# Patient Record
Sex: MALE
[De-identification: names, ages, dates, MRNs, and addresses within clinical notes are randomized per-mention and may not be internally consistent; named-entity substitution may affect disease eponyms.]

---

## 2017-12-18 ENCOUNTER — HOSPITAL ENCOUNTER (INPATIENT)
Dept: HOSPITAL 42 - ED | Age: 77
LOS: 4 days | Discharge: NURSING, REHAB OR LONG-TERM CARE | DRG: 194 | End: 2017-12-22
Attending: INTERNAL MEDICINE | Admitting: INTERNAL MEDICINE
Payer: MEDICARE

## 2017-12-18 VITALS — BODY MASS INDEX: 34 KG/M2

## 2017-12-18 DIAGNOSIS — E66.01: ICD-10-CM

## 2017-12-18 DIAGNOSIS — N18.9: ICD-10-CM

## 2017-12-18 DIAGNOSIS — I13.0: ICD-10-CM

## 2017-12-18 DIAGNOSIS — Z79.84: ICD-10-CM

## 2017-12-18 DIAGNOSIS — J44.0: ICD-10-CM

## 2017-12-18 DIAGNOSIS — E11.22: ICD-10-CM

## 2017-12-18 DIAGNOSIS — R65.10: ICD-10-CM

## 2017-12-18 DIAGNOSIS — R19.7: ICD-10-CM

## 2017-12-18 DIAGNOSIS — I35.0: ICD-10-CM

## 2017-12-18 DIAGNOSIS — M10.9: ICD-10-CM

## 2017-12-18 DIAGNOSIS — Z79.01: ICD-10-CM

## 2017-12-18 DIAGNOSIS — E87.6: ICD-10-CM

## 2017-12-18 DIAGNOSIS — T36.95XA: ICD-10-CM

## 2017-12-18 DIAGNOSIS — E78.5: ICD-10-CM

## 2017-12-18 DIAGNOSIS — F17.200: ICD-10-CM

## 2017-12-18 DIAGNOSIS — I50.9: ICD-10-CM

## 2017-12-18 DIAGNOSIS — Z85.46: ICD-10-CM

## 2017-12-18 DIAGNOSIS — Z88.0: ICD-10-CM

## 2017-12-18 DIAGNOSIS — N40.0: ICD-10-CM

## 2017-12-18 DIAGNOSIS — I48.0: ICD-10-CM

## 2017-12-18 DIAGNOSIS — Z79.4: ICD-10-CM

## 2017-12-18 DIAGNOSIS — Z79.82: ICD-10-CM

## 2017-12-18 DIAGNOSIS — J18.9: Primary | ICD-10-CM

## 2017-12-18 LAB
ALBUMIN/GLOB SERPL: 1 {RATIO} (ref 1.1–1.8)
ALP SERPL-CCNC: 100 U/L (ref 38–126)
ALT SERPL-CCNC: 27 U/L (ref 7–56)
APPEARANCE UR: CLEAR
APTT BLD: 29.9 SECONDS (ref 25.1–36.5)
AST SERPL-CCNC: 18 U/L (ref 17–59)
BASE EXCESS BLDV CALC-SCNC: 1.5 MMOL/L (ref 0–2)
BASOPHILS # BLD AUTO: 0.01 K/MM3 (ref 0–2)
BASOPHILS NFR BLD: 0.1 % (ref 0–3)
BILIRUB SERPL-MCNC: 0.5 MG/DL (ref 0.2–1.3)
BILIRUB UR-MCNC: NEGATIVE MG/DL
BUN SERPL-MCNC: 20 MG/DL (ref 7–21)
CALCIUM SERPL-MCNC: 8.5 MG/DL (ref 8.4–10.5)
CHLORIDE SERPL-SCNC: 103 MMOL/L (ref 98–107)
CO2 SERPL-SCNC: 27 MMOL/L (ref 21–33)
COLOR UR: YELLOW
EOSINOPHIL # BLD: 0.2 10*3/UL (ref 0–0.7)
EOSINOPHIL NFR BLD: 1.6 % (ref 1.5–5)
EPI CELLS #/AREA URNS HPF: (no result) /HPF (ref 0–5)
ERYTHROCYTE [DISTWIDTH] IN BLOOD BY AUTOMATED COUNT: 13.5 % (ref 11.5–14.5)
GLOBULIN SER-MCNC: 3 GM/DL
GLUCOSE SERPL-MCNC: 188 MG/DL (ref 70–110)
GLUCOSE UR STRIP-MCNC: NEGATIVE MG/DL
GRANULOCYTES # BLD: 9.63 10*3/UL (ref 1.4–6.5)
GRANULOCYTES NFR BLD: 82.4 % (ref 50–68)
HCT VFR BLD CALC: 29.4 % (ref 42–52)
INR PPP: 1.27 (ref 0.93–1.08)
KETONES UR STRIP-MCNC: NEGATIVE MG/DL
LEUKOCYTE ESTERASE UR-ACNC: NEGATIVE LEU/UL
LYMPHOCYTES # BLD: 0.9 10*3/UL (ref 1.2–3.4)
LYMPHOCYTES NFR BLD AUTO: 7.5 % (ref 22–35)
MCH RBC QN AUTO: 28.7 PG (ref 25–35)
MCHC RBC AUTO-ENTMCNC: 34.7 G/DL (ref 31–37)
MCV RBC AUTO: 82.8 FL (ref 80–105)
MONOCYTES # BLD AUTO: 1 10*3/UL (ref 0.1–0.6)
MONOCYTES NFR BLD: 8.4 % (ref 1–6)
PH BLDV: 7.38 [PH] (ref 7.32–7.43)
PH UR STRIP: 6 [PH] (ref 4.7–8)
PLATELET # BLD: 308 10^3/UL (ref 120–450)
PMV BLD AUTO: 10.6 FL (ref 7–11)
POTASSIUM SERPL-SCNC: 3 MMOL/L (ref 3.6–5)
PROT SERPL-MCNC: 6.1 G/DL (ref 5.8–8.3)
PROT UR STRIP-MCNC: (no result) MG/DL
RBC # UR STRIP: NEGATIVE /UL
RBC #/AREA URNS HPF: (no result) /HPF (ref 0–2)
SODIUM SERPL-SCNC: 138 MMOL/L (ref 132–148)
SP GR UR STRIP: 1.01 (ref 1–1.03)
TROPONIN I SERPL-MCNC: 0.04 NG/ML
UROBILINOGEN UR STRIP-ACNC: 0.2 E.U./DL
WBC # BLD AUTO: 11.7 10^3/UL (ref 4.5–11)
WBC #/AREA URNS HPF: (no result) /HPF (ref 0–6)

## 2017-12-18 RX ADMIN — VANCOMYCIN HYDROCHLORIDE SCH MG: 500 INJECTION, POWDER, LYOPHILIZED, FOR SOLUTION INTRAVENOUS at 22:05

## 2017-12-18 RX ADMIN — INSULIN HUMAN SCH: 100 INJECTION, SOLUTION PARENTERAL at 22:51

## 2017-12-18 RX ADMIN — AZTREONAM SCH: 1 INJECTION, SOLUTION INTRAVENOUS at 21:02

## 2017-12-18 RX ADMIN — IPRATROPIUM BROMIDE AND ALBUTEROL SULFATE SCH ML: .5; 3 SOLUTION RESPIRATORY (INHALATION) at 19:52

## 2017-12-18 NOTE — RAD
HISTORY:

cough  



COMPARISON:

Chest x-ray performed 8/8/12



TECHNIQUE:

Chest, one view.



FINDINGS:





LUNGS:

Extensive patchy opacities throughout the right vane thorax with 

sparing of the right lung apex.  Appearance concerning for pneumonia. 

 Recommend follow-up upon completion of treatment of acute symptoms 

in order to ensure complete resolution and exclude possibility of 

underlying neoplasm. 



Please note that chest x-ray has limited sensitivity for the 

detection of pulmonary masses.



PLEURA:

No significant pleural effusion identified. No definite pneumothorax .



CARDIOVASCULAR:

Partially obscured right heart border.  Heart size appears top normal.



OSSEOUS STRUCTURES:

 Degenerative changes.



VISUALIZED UPPER ABDOMEN:

Unremarkable.



OTHER FINDINGS:

None.



IMPRESSION:

Extensive patchy opacities throughout the right vane thorax with 

sparing of the right lung apex.  Appearance concerning for pneumonia. 

 Recommend follow-up upon completion of treatment of acute symptoms 

in order to ensure complete resolution and exclude possibility of 

underlying neoplasm.

## 2017-12-18 NOTE — ED PDOC
Arrival/HPI





- General


Time Seen by Provider: 12/18/17 12:15


Historian: Patient





- History of Present Illness


Narrative History of Present Illness (Text): 





12/18/17 12:15


76yo male with Past medical history  significant for hypertension, NIDDM, COPD, 

biba from his PMD's office for one month history of diarrhea, brownish 

productive cough, MONTALVO and generalized weakness. States he was treated for 

Pneumonia and then he started having diarrhea s/p. MONTALVO started recently. One 

episode of diarrhea today. Denies fever, chills, abdominal pain, chest pain, 

palpitation, diaphoresis, orthopnea, LE edema, calf pain, any other [id number]. 

Notes the he was told a week ago after chest xray that he have CHF.





Past Medical History





- Provider Review


Nursing Documentation Reviewed: Yes





- Pulmonary


Hx Respiratory Disorders:  (REMAINS CONGESTED,PNEUMONIA)





- HEENT


Hx HEENT Disorder:  (READING,TONSILLECTOMY)





- Musculoskeletal/Rheumatological


Hx Falls: No





- Psychiatric


Hx Depression: No


Hx Emotional Abuse: No


Hx Physical Abuse: No


Hx Substance Use: No





- Anesthesia


Hx Anesthesia: Yes





- Suicidal Assessment


Feels Threatened In Home Enviroment: No





Family/Social History





- Physician Review


Nursing Documentation Reviewed: Yes


Family/Social History: Unknown Family HX


Smoking Status: Former Smoker


Hx Alcohol Use: No


Hx Substance Use: No


Hx Substance Use Treatment: No





Allergies/Home Meds


Allergies/Adverse Reactions: 


Allergies





atorvastatin Allergy (Verified 12/18/17 12:24)


 RASH


Penicillins Allergy (Verified 12/18/17 12:24)


 PAIN


procaine Allergy (Verified 12/18/17 12:24)


 RASH


propoxyphene Allergy (Verified 12/18/17 12:24)


 RASH


darvon Allergy (Severe, Uncoded 07/30/12 11:20)


 PAIN


 palpitations 


novacaine Allergy (Severe, Uncoded 07/30/12 11:18)


 PAIN


 palpitations 








Home Medications: 


 Home Meds











 Medication  Instructions  Recorded  Confirmed


 


Aspirin 81 mg PO DAILY 07/30/12 12/18/17


 


Bethanechol Chloride [Bethanechol] 25 mg PO BID 07/30/12 12/18/17


 


Carvedilol [Coreg] 12.5 mg PO BID 07/30/12 12/18/17


 


Doxercalciferol [Hectorol] 2.5 mcg PO DAILY 07/30/12 12/18/17


 


Eplerenone [Inspra] 25 mg PO DAILY 07/30/12 12/18/17


 


Ezetimibe [Zetia] 10 mg PO DAILY 07/30/12 12/18/17


 


Finasteride 5 mg PO DAILY 07/30/12 12/18/17


 


Furosemide 20 mg PO BID 07/30/12 12/18/17


 


Glimepiride 2 mg PO BID 07/30/12 12/18/17


 


Nisoldipine [Sular] 25.5 mg PO DAILY 07/30/12 12/18/17


 


Sildenafil Citrate [Viagra] 100 mg PO PRN PRN 07/30/12 12/18/17


 


Tamsulosin [Flomax] 0.4 mg PO BID 07/30/12 12/18/17


 


Acarbose [Precose] 50 mg PO TID 12/18/17 12/18/17


 


Chlorthalidone 0.5 tab PO MWF 12/18/17 12/18/17


 


Colchicine 0.6 PO DAILY PRN 12/18/17 


 


Ergocalciferol (Vitamin D2) PO MON 12/18/17 





[Vitamin D2]   


 


Icosapent Ethyl [Vascepa] 1 gm PO BID 12/18/17 12/18/17


 


Irbesartan [Avapro] 300 mg PO MWF 12/18/17 12/18/17


 


Linagliptin [Tradjenta] 5 mg PO DAILY 12/18/17 12/18/17


 


Omeprazole 40 DAILY 12/18/17 


 


Repaglinide [Prandin] 0.5 mg PO DAILY 12/18/17 12/18/17


 


Uloric 80 PRN PRN 12/18/17 














Review of Systems





- Physician Review


All systems were reviewed & negative as marked: Yes





- Review of Systems


Constitutional: Fatigue


Eyes: Normal


ENT: Normal


Respiratory: Cough, Sputum


Cardiovascular: Normal


Gastrointestinal: Diarrhea.  absent: Abdominal Pain, Constipation, Nausea, 

Vomiting, Hematochezia, Hematemesis


Genitourinary Male: Normal


Musculoskeletal: Normal


Skin: Normal


Neurological: Normal


Endocrine: Normal


Hemo/Lymphatic: Normal


Psychiatric: Normal





Physical Exam


Vital Signs Reviewed: Yes


Vital Signs











  Temp Pulse Resp BP Pulse Ox


 


 12/18/17 13:58   110 H  18  139/64  96


 


 12/18/17 13:48   114 H   139/64 


 


 12/18/17 12:17  98.0 F  98 H  18  142/90  96











Temperature: Afebrile


Blood Pressure: Normal


Pulse: Regular


Respiratory Rate: Normal


Appearance: Positive for: Well-Appearing, Non-Toxic, Comfortable, Other (

Morbildy obese)


Pain Distress: None


Mental Status: Positive for: Alert and Oriented X 3





- Systems Exam


Head: Present: Atraumatic, Normocephalic


Pupils: Present: PERRL


Extroacular Muscles: Present: EOMI


Conjunctiva: Present: Normal


Mouth: Present: Moist Mucous Membranes


Neck: Present: Normal Range of Motion


Respiratory/Chest: Present: Clear to Auscultation, Good Air Exchange, Rhonchi (

Scattered).  No: Respiratory Distress, Accessory Muscle Use, Wheezes, Decreased 

Breath Sounds, Rales, Retracting


Cardiovascular: Present: Regular Rate and Rhythm, Normal S1, S2.  No: Murmurs


Abdomen: Present: Distention (Distention secondary to body habitus), Normal 

Bowel Sounds.  No: Tenderness, Peritoneal Signs, Rebound, Guarding, McBurney's 

Point Tender, Rovsing's Sign Present


Back: Present: Normal Inspection


Upper Extremity: Present: Normal Inspection.  No: Cyanosis, Edema


Lower Extremity: Present: Normal Inspection.  No: Edema


Neurological: Present: GCS=15, CN II-XII Intact, Speech Normal


Skin: Present: Warm, Dry, Normal Color.  No: Rashes


Psychiatric: Present: Alert, Oriented x 3, Normal Insight, Normal Concentration





Medical Decision Making


ED Course and Treatment: 





12/18/17 18:22


Pt presented for stated history. He was tachy, but not hypoxic. He was not in 

respiratory distress.





Small leukocytosis was noted. Hypokalemia (3.0) and BNP >8000 was noted. 

Potassium was repleted. 





Afib with RVR.


Couldn't find old EKG to compare. PT was given 5mg of lopressor.





CXR RML infiltrate/PNE.





Pt was on oral antibiotic and failed outpt therapy. LEvaquin was started for 

Pneumonia





C. Diff toxin ordered and pending.





Case was DW Dr. Pathak and she accepted pt into her service.














- Lab Interpretations


Lab Results: 








 12/18/17 12:49 





 12/18/17 12:49 





 Lab Results





12/18/17 12:49: Sodium 138, Chloride 103, Potassium 3.0 L, Carbon Dioxide 27, 

Anion Gap 11, BUN 20, Creatinine 1.4, Est GFR (African Amer) 60, Est GFR (Non-

Af Amer) 49, Random Glucose 188 H, Calcium 8.5, Total Bilirubin 0.5, AST 18, 

ALT 27, Alkaline Phosphatase 100, Lactate Dehydrogenase 469, Total Creatine 

Kinase 130, Troponin I 0.04, NT-Pro-B Natriuret Pep 8660 H, Total Protein 6.1, 

Albumin 3.1, Globulin 3.0, Albumin/Globulin Ratio 1.0 L


12/18/17 12:49: pO2 42, VBG pH 7.38, VBG pCO2 46.0, VBG HCO3 27.2, VBG Total 

CO2 28.6 H, VBG O2 Sat (Calc) 79.1 H, VBG Base Excess 1.5, VBG Potassium 2.9 L, 

Sodium 138.0, Chloride 105.0, Glucose 193 H, Lactate 1.3, FiO2 21.0, Venous 

Blood Potassium 2.9 L


12/18/17 12:49: PT 14.0 H, INR 1.27 H, APTT 29.9


12/18/17 12:49: WBC 11.7 H, RBC 3.55, Hgb 10.2 L, Hct 29.4 L, MCV 82.8, MCH 28.7

, MCHC 34.7, RDW 13.5, Plt Count 308, MPV 10.6, Gran % 82.4 H, Lymph % (Auto) 

7.5 L, Mono % (Auto) 8.4 H, Eos % (Auto) 1.6, Baso % (Auto) 0.1, Gran # 9.63 H, 

Lymph # 0.9 L, Mono # 1.0 H, Eos # 0.2, Baso # 0.01











- RAD Interpretation


Radiology Orders: 








12/18/17 12:40


CHEST PORTABLE [RAD] Stat 














- Medication Orders


Current Medication Orders: 








Acetaminophen (Tylenol 325mg Tab)  650 mg PO Q6H PRN


   PRN Reason: Fever >100.4 F


Albuterol/Ipratropium (Duoneb 3 Mg/0.5 Mg (3 Ml) Ud)  3 ml IH Q2H PRN


   PRN Reason: Shortness of Breath


Albuterol/Ipratropium (Duoneb 3 Mg/0.5 Mg (3 Ml) Ud)  3 ml IH [id number] [facility]


Aspirin (Ecotrin)  81 mg PO DAILY [facility]


Bethanechol Chloride (Urecholine)  25 mg PO TID [facility]


   Last Admin: 12/18/17 18:15  Dose: 25 mg





Carvedilol (Coreg)  12.5 mg PO BID [facility]


Doxercalciferol (Hectorol)  2.5 mcg PO DAILY [facility]


Finasteride (Proscar)  5 mg PO DAILY [facility]


Glimepiride (Amaryl)  2 mg PO BID [facility]


Doxycycline Hyclate 100 mg/ (Sodium Chloride)  100 mls @ 100 mls/hr IVPB Q12 [facility]


   PRN Reason: Protocol


Aztreonam (Azactam 1 Gm)  100 mls @ 100 mls/hr IV Q12 [facility]


   PRN Reason: Protocol


   Stop: 12/27/17 22:01


Insulin Human Regular (Humulin R Med)  0 units SC ACHS [facility]


   PRN Reason: Protocol


Ondansetron HCl (Zofran Inj)  4 mg IVP Q6H PRN


   PRN Reason: Nausea/Vomiting


Pantoprazole Sodium (Protonix Ec Tab)  40 mg PO 0630 [facility]


Tamsulosin HCl (Flomax)  0.4 mg PO BID [facility]


Valsartan (Diovan)  320 mg PO DAILY [facility]


Vancomycin HCl (Vancocin 25 Mg/Ml (Oral Use))  250 mg PO QID [facility]


   PRN Reason: Protocol


   Stop: 01/01/18 22:01





Discontinued Medications





Albuterol/Ipratropium (Duoneb 3 Mg/0.5 Mg (3 Ml) Ud)  3 ml IH STAT STA


   Stop: 12/18/17 13:29


   Last Admin: 12/18/17 13:48  Dose: 3 ml





Levofloxacin/Dextrose (Levaquin 500mg)  500 mg in 100 mls @ 100 mls/hr IVPB 

STAT STA


   PRN Reason: Protocol


   Stop: 12/18/17 14:04


   Last Admin: 12/18/17 13:34  Dose: 100 mls/hr





eMAR Start Stop


 Document     12/18/17 13:34  HI  (Rec: 12/18/17 13:34  HI  [facility] [id number])


     Intravenous Solution


      Start Date                                 12/18/17


      Start Time                                 13:34





Potassium Chloride (Potassium Chloride 20 Meq/100 Ml)  20 meq in 100 mls @ 50 

mls/hr IVPB Q2H STA


   Stop: 12/18/17 15:13


   Last Admin: 12/18/17 13:34  Dose: 50 mls/hr





eMAR Start Stop


 Document     12/18/17 13:34  HI  (Rec: 12/18/17 13:34  [id number])


     Intravenous Solution


      Start Date                                 12/18/17


      Start Time                                 13:34





Metoprolol Tartrate (Lopressor)  5 mg IVP STAT STA


   Stop: 12/18/17 13:29


   Last Admin: 12/18/17 13:48  Dose: 5 mg





IVP Administration


 Document     12/18/17 13:48  HI  (Rec: 12/18/17 13:48  [id number])


     Charges for Administration


      # of IVP Administrations                   1


MAR Pulse and Blood Pressure


 Document     12/18/17 13:48  HI  (Rec: 12/18/17 13:48  [id number])


     Pulse


      Pulse Rate (60-90)                         114


     Blood Pressure


      Blood Pressure (100//90)             139/64














Disposition/Present on Arrival





- Present on Arrival


Any Indicators Present on Arrival: No


History of DVT/PE: No


History of Uncontrolled Diabetes: No


Urinary Catheter: No


History of Decub. Ulcer: No


History Surgical Site Infection Following: None





- Disposition


Have Diagnosis and Disposition been Completed?: Yes


Diagnosis: 


 Pneumonia, Afib, Hypokalemia, CHF (congestive heart failure)





Disposition: HOSPITALIZED


Disposition Time: 17:00


Patient Problems: 


 Current Active Problems











Problem Status Onset


 


Afib Acute  


 


CHF (congestive heart failure) Acute  


 


Hypokalemia Acute  


 


Pneumonia Acute  











Condition: FAIR

## 2017-12-19 LAB
ALBUMIN/GLOB SERPL: 1.1 {RATIO} (ref 1.1–1.8)
ALP SERPL-CCNC: 96 U/L (ref 38–126)
ALT SERPL-CCNC: 29 U/L (ref 7–56)
AST SERPL-CCNC: 20 U/L (ref 17–59)
BILIRUB SERPL-MCNC: 0.4 MG/DL (ref 0.2–1.3)
BUN SERPL-MCNC: 21 MG/DL (ref 7–21)
CALCIUM SERPL-MCNC: 8.8 MG/DL (ref 8.4–10.5)
CHLORIDE SERPL-SCNC: 105 MMOL/L (ref 98–107)
CO2 SERPL-SCNC: 27 MMOL/L (ref 21–33)
ERYTHROCYTE [DISTWIDTH] IN BLOOD BY AUTOMATED COUNT: 13.8 % (ref 11.5–14.5)
GLOBULIN SER-MCNC: 2.9 GM/DL
GLUCOSE SERPL-MCNC: 140 MG/DL (ref 70–110)
HCT VFR BLD CALC: 29 % (ref 42–52)
MCH RBC QN AUTO: 27.4 PG (ref 25–35)
MCHC RBC AUTO-ENTMCNC: 32.8 G/DL (ref 31–37)
MCV RBC AUTO: 83.6 FL (ref 80–105)
PLATELET # BLD: 296 10^3/UL (ref 120–450)
PMV BLD AUTO: 10.2 FL (ref 7–11)
POTASSIUM SERPL-SCNC: 3.5 MMOL/L (ref 3.6–5)
PROT SERPL-MCNC: 6 G/DL (ref 5.8–8.3)
SODIUM SERPL-SCNC: 140 MMOL/L (ref 132–148)
WBC # BLD AUTO: 10.7 10^3/UL (ref 4.5–11)

## 2017-12-19 RX ADMIN — VANCOMYCIN HYDROCHLORIDE SCH MG: 500 INJECTION, POWDER, LYOPHILIZED, FOR SOLUTION INTRAVENOUS at 22:04

## 2017-12-19 RX ADMIN — AZTREONAM SCH MLS/HR: 1 INJECTION, SOLUTION INTRAVENOUS at 22:03

## 2017-12-19 RX ADMIN — VANCOMYCIN HYDROCHLORIDE SCH MG: 500 INJECTION, POWDER, LYOPHILIZED, FOR SOLUTION INTRAVENOUS at 13:22

## 2017-12-19 RX ADMIN — INSULIN HUMAN SCH UNITS: 100 INJECTION, SOLUTION PARENTERAL at 12:26

## 2017-12-19 RX ADMIN — IPRATROPIUM BROMIDE AND ALBUTEROL SULFATE SCH ML: .5; 3 SOLUTION RESPIRATORY (INHALATION) at 07:25

## 2017-12-19 RX ADMIN — POTASSIUM CHLORIDE SCH MEQ: 20 TABLET, EXTENDED RELEASE ORAL at 18:05

## 2017-12-19 RX ADMIN — PANTOPRAZOLE SODIUM SCH MG: 40 TABLET, DELAYED RELEASE ORAL at 05:38

## 2017-12-19 RX ADMIN — IPRATROPIUM BROMIDE AND ALBUTEROL SULFATE SCH ML: .5; 3 SOLUTION RESPIRATORY (INHALATION) at 13:17

## 2017-12-19 RX ADMIN — VANCOMYCIN HYDROCHLORIDE SCH MG: 500 INJECTION, POWDER, LYOPHILIZED, FOR SOLUTION INTRAVENOUS at 18:00

## 2017-12-19 RX ADMIN — IPRATROPIUM BROMIDE AND ALBUTEROL SULFATE SCH: .5; 3 SOLUTION RESPIRATORY (INHALATION) at 20:00

## 2017-12-19 RX ADMIN — INSULIN HUMAN SCH: 100 INJECTION, SOLUTION PARENTERAL at 07:54

## 2017-12-19 RX ADMIN — IPRATROPIUM BROMIDE AND ALBUTEROL SULFATE SCH ML: .5; 3 SOLUTION RESPIRATORY (INHALATION) at 01:54

## 2017-12-19 RX ADMIN — POTASSIUM CHLORIDE SCH MEQ: 20 TABLET, EXTENDED RELEASE ORAL at 10:15

## 2017-12-19 RX ADMIN — VANCOMYCIN HYDROCHLORIDE SCH MG: 500 INJECTION, POWDER, LYOPHILIZED, FOR SOLUTION INTRAVENOUS at 10:15

## 2017-12-19 RX ADMIN — INSULIN HUMAN SCH: 100 INJECTION, SOLUTION PARENTERAL at 18:00

## 2017-12-19 RX ADMIN — AZTREONAM SCH MLS/HR: 1 INJECTION, SOLUTION INTRAVENOUS at 10:09

## 2017-12-19 NOTE — PN
DATE:



SUBJECTIVE:  The patient is 77-year-old, seen and examined, doing well.  He

states he feels lot better.  No nausea, vomiting or diarrhea.  Cough has

improved.



PHYSICAL EXAMINATION:

VITAL SIGNS:  The patient is afebrile, pulse 75, respiration 20, blood

pressure 127/48.

LUNGS:  Bilateral good airflow.  No rhonchi or crackle.

HEART:  S1, S2, audible.

ABDOMEN:  Soft, obese, nontender.  No rebound or guarding.

NEUROLOGIC:  The patient is awake, alert, oriented, communicative.



LABORATORY DATA:  WBC is 10.7, hemoglobin 9.5, hematocrit 29, platelets

296.  Chemistry:  Sodium 140, potassium 3.5, chloride 105, CO2 27, BUN 21,

creatinine 1.4, blood sugar of 145.  Blood cultures are negative.  Stool

for C. diff negative.



ASSESSMENT:

1.  Multifocal community-acquired pneumonia, failed outpatient treatment.

2.  Morbid obesity.

3.  Diarrhea induced by erythromycin, negative for Clostridium difficile.

4.  History of chronic obstructive pulmonary disease.

5.  Insulin-dependent diabetes.

6.  Renal insufficiency.



PLAN:  Currently, the patient is on Azactam since he is ALLERGIC TO

PENICILLIN and he is on doxycycline.  Continue to monitor his blood sugar,

out of bed to chair, followup echocardiogram.  We will reevaluate the

patient in a.m.







__________________________________________

Audie Pathak MD







DD:  12/19/2017 18:01:56

DT:  12/19/2017 18:06:05

Job # [id number]

## 2017-12-19 NOTE — CP.PCM.PN
Subjective





- Date & Time of Evaluation


Date of Evaluation: 12/19/17


Time of Evaluation: 09:20





- Subjective


Subjective: 





Patient has very poor veins,needs iv access





Objective





- Vital Signs/Intake and Output


Vital Signs (last 24 hours): 


 











Temp Pulse Resp BP Pulse Ox


 


 98.3 F   78   20   140/80   90 L


 


 12/19/17 16:00  12/19/17 18:04  12/19/17 16:00  12/19/17 18:04  12/19/17 16:00








Intake and Output: 


 











 12/19/17 12/20/17





 18:59 06:59


 


Intake Total 960 


 


Output Total 600 


 


Balance 360 














- Medications


Medications: 


 Current Medications





Acetaminophen (Tylenol 325mg Tab)  650 mg PO Q6H PRN


   PRN Reason: Fever >100.4 F


Albuterol/Ipratropium (Duoneb 3 Mg/0.5 Mg (3 Ml) Ud)  3 ml IH Q2H PRN


   PRN Reason: Shortness of Breath


Albuterol/Ipratropium (Duoneb 3 Mg/0.5 Mg (3 Ml) Ud)  3 ml IH [id number] [facility]


   Last Admin: 12/19/17 13:17 Dose:  3 ml


Amlodipine Besylate (Norvasc)  10 mg PO STAT [facility]


   Last Admin: 12/19/17 14:32 Dose:  10 mg


Amlodipine Besylate (Norvasc)  10 mg PO DAILY [facility]


Apixaban (Eliquis)  5 mg PO BID [facility]


   PRN Reason: Protocol


   Last Admin: 12/19/17 18:05 Dose:  5 mg


Aspirin (Ecotrin)  81 mg PO DAILY [facility]


   Last Admin: 12/19/17 10:11 Dose:  Not Given


Aspirin (Aspirin Chewable)  81 mg PO DAILY [facility]


   Last Admin: 12/19/17 10:07 Dose:  81 mg


Bethanechol Chloride (Urecholine)  25 mg PO TID [facility]


   Last Admin: 12/19/17 18:05 Dose:  25 mg


Carvedilol (Coreg)  12.5 mg PO BID [facility]


   Last Admin: 12/19/17 18:04 Dose:  12.5 mg


Doxercalciferol (Hectorol)  2.5 mcg PO DAILY [facility]


   Last Admin: 12/19/17 10:29 Dose:  2.5 mcg


Doxycycline Hyclate (Doryx)  100 mg PO Q12 [facility]


Ezetimibe (Zetia)  10 mg PO DAILY [facility]


   Last Admin: 12/19/17 10:08 Dose:  10 mg


Finasteride (Proscar)  5 mg PO DAILY [facility]


   Last Admin: 12/19/17 10:15 Dose:  5 mg


Furosemide (Lasix)  40 mg PO DAILY [facility]


   Last Admin: 12/19/17 10:15 Dose:  40 mg


Glimepiride (Amaryl)  2 mg PO BID [facility]


   Last Admin: 12/19/17 18:04 Dose:  2 mg


Aztreonam (Azactam 1 Gm)  100 mls @ 100 mls/hr IV Q12 [facility]


   PRN Reason: Protocol


   Stop: 12/27/17 22:01


   Last Admin: 12/19/17 10:09 Dose:  100 mls/hr


Insulin Human Regular (Humulin R Med)  0 units SC ACHS [facility]


   PRN Reason: Protocol


   Last Admin: 12/19/17 18:00 Dose:  Not Given


Ondansetron HCl (Zofran Inj)  4 mg IVP Q6H PRN


   PRN Reason: Nausea/Vomiting


Pantoprazole Sodium (Protonix Ec Tab)  40 mg PO 0630 [facility]


   Last Admin: 12/19/17 05:38 Dose:  40 mg


Potassium Chloride (K-Dur 20 Meq Er Tab)  20 meq PO BID [facility]


   Last Admin: 12/19/17 18:05 Dose:  20 meq


Tamsulosin HCl (Flomax)  0.4 mg PO BID [facility]


   Last Admin: 12/19/17 18:05 Dose:  0.4 mg


Valsartan (Diovan)  320 mg PO DAILY [facility]


   Last Admin: 12/19/17 10:10 Dose:  320 mg


Vancomycin HCl (Vancocin 25 Mg/Ml (Oral Use))  250 mg PO QID [facility]


   PRN Reason: Protocol


   Stop: 01/01/18 22:01


   Last Admin: 12/19/17 18:00 Dose:  250 mg











- Labs


Labs: 


 





 12/19/17 07:45 





 12/19/17 07:45 





 











PT  14.0 SECONDS (9.4-12.5)  H  12/18/17  12:49    


 


INR  1.27  (0.93-1.08)  H  12/18/17  12:49    


 


APTT  29.9 Seconds (25.1-36.5)   12/18/17  12:49    














- Constitutional


Appears: No Acute Distress





Assessment and Plan





- Assessment and Plan (Free Text)


Assessment: 





Poor venous access


Plan: 





Hep lock inserted in the L hand.


#24 angiocath used.

## 2017-12-19 NOTE — CARD
--------------- APPROVED REPORT --------------





EKG Measurement

Heart [id number]

[id number]

[id number]

[id number]



<Conclusion>

Atrial fibrillation with rapid ventricular response

Left axis deviation/LAHB

IVCD

PRWP

NSSTW changes

## 2017-12-19 NOTE — CARD
--------------- APPROVED REPORT --------------





EKG Measurement

Heart [id number]

SC [id number]

[id number]

[id number]

[id number]



<Conclusion>

Normal sinus rhythm, new c/w ECG 12/18/17 which showed AF



PRWP

Left anterior fascicular block

Prolonged QT

## 2017-12-19 NOTE — CON
DATE:  12/19/2017



INDICATION:  Shortness of breath, cough, sputum production, pneumonia.



HISTORY OF PRESENT ILLNESS:  This is a 77-year-old man known to me,

admitted yesterday through the emergency room when he presented with

shortness of breath, cough, sputum production, generalized weakness.  He

had been treated on outpatient basis for possible pneumonia.  He was given

antibiotics.  He developed diarrhea.  He became weaker.  He came to the

emergency room and was admitted.  He was found to have a right lower lobe

pneumonia.  He was admitted to room 565.  He is pan cultured.  He is

started on antibiotics.  This morning, he feels better.  There is less

shortness of breath.  He still has cough, and he still feels weak.  There

was no chest pain, orthopnea, PND, syncope, dizziness, vertigo,

palpitations, claudication, abdominal pain, nausea, vomiting, diarrhea,

constipation, melena.



PAST MEDICAL HISTORY:  Complex.  He has had a prior remote pneumonia.  He

has diabetes, hypertension, COPD.  He is a former smoker.  Hyperlipidemia,

BPH, chronic kidney disease, and gout.  There is no history of rheumatic

fever, myocardial infarction, angina, arrhythmia, stroke, TIA.



MEDICATIONS AT THE TIME OF ADMISSION:  Includes aspirin, bethanechol,

Coreg, Hectorol, Inspra, Zetia, finasteride, Lasix, glimepiride, Sular,

Flomax, Precose, chlorthalidone, Avapro, Tradjenta, omeprazole,

Prandin, Uloric.



ALLERGIES:  HE NOTES ALLERGIES TO SEVERAL MEDICATIONS INCLUDING LIPITOR,

PENICILLIN, PROCAINE, PROPOXYPHENE, NOVOCAINE.



SOCIAL HISTORY:  He lives at home.  He is ambulatory, but limited.  He is a

former smoker.  He does not drink alcohol significantly.



FAMILY HISTORY:  Noncontributory.



REVIEW OF SYSTEMS:  A 10-point review of systems is otherwise unremarkable

except as noted above.



PHYSICAL EXAMINATION:

GENERAL:  He is a well-developed male, lying in bed, on 5R, in no acute

distress.

VITAL SIGNS:  Notable for pulse of 80.  He is afebrile.  Blood pressure

elevated at 188/82, respirations 18-22, O2 sat 94-96% on nasal cannula and

room air.

HEENT:  Reveals no neck vein distention, thyromegaly, or carotid bruits. 

Mucous membranes moist.  Conjunctivae pink.

NECK:  Supple.

LUNGS:  Lung fields, rhonchi mostly on the right side.  No wheezing.

HEART:  Reveals normal first and second heart sounds.  There is a soft

systolic murmur along the left sternal border.

ABDOMEN:  Soft.  Bowel sounds present.  No mass, organomegaly, tenderness,

rebound, guarding, CVA tenderness, or palpable abdominal aortic aneurysm.

EXTREMITIES:  Reveal no cyanosis, clubbing, or edema.

NEUROLOGIC:  He is awake, alert, and oriented.

PSYCHIATRIC:  Normalized mood and affect.

SKIN:  Warm and dry.  No rash or cellulitis.



LABORATORY AND IMAGING:  A chest x-ray revealed extensive patchy opacities

throughout the right hemithorax with sparing of the right lung apex. 

Appearance concerning for pneumonia.  EKG demonstrates atrial fibrillation

with left anterior hemiblock, intraventricular conduction delay, poor

R-wave progression, nonspecific ST-wave changes.  White count 10,700,

hemoglobin 9.5, hematocrit 29, platelet count 296,000.  PT 14, INR 1.27,

PTT 29.9.  Blood gas as noted.  Electrolytes:  BUN, creatinine, blood

sugars noted.  Creatinine 1.4, potassium 3.5.  LFTs unremarkable.  ,

troponin 0.04.  BNP 8660.  Urinalysis is noted.



IMPRESSION:  Spencer Olvera is a 77-year-old man admitted with 1-month

history of symptoms, found to have a right lung pneumonia with shortness of

breath, cough, sputum production, weakness, and diarrhea after a course of

antibiotics.  He is also in atrial fibrillation which may be a new rhythm.



At this time, I agree with current plans.  He has been cultured.  He is

getting antibiotics.  There was an ID consultation pending.  I will review

his old records.  We will continue his usual medications.  Given the

presence of atrial fibrillation, he should be considered for

anticoagulation.  I will begin with Eliquis 5 mg b.i.d.  We will check

stool for occult blood.  I will review his old records.  We will get an

echocardiogram.  We will check thyroid functions.  We will continue Coreg

12.5 mg b.i.d. for rate control and hypertension.  Other cardiac

medications include aspirin, Diovan, Lasix, potassium chloride, and

lisinopril, and Zetia will be continued for an hour.  We will monitor I's

and O's, check stool for occult blood.  I will follow along with you.  I

will make additional recommendations based on his clinical course.





__________________________________________

Felice Beth MD



DD:  12/19/2017 9:24:47

DT:  12/19/2017 11:36:36

Job # [id number]

STEPHAN

## 2017-12-19 NOTE — HP
HISTORY OF PRESENT ILLNESS:  Patient is 77 years old came to the emergency

room because of history of intermittent diarrhea secondary to the

antibiotic that was given because he has been coughing for almost a week. 

Patient states he was treated for pneumonia, and he was given antibiotic

unknown to him.  He started to have diarrhea that is going on

intermittently for almost a month.  He complained of having generalized

weakness, complained of brownish productive cough, complained of diarrhea

with no blood in the stool, complained of generalized weakness.  Denies any

abdominal pain.  No chest pain.  No palpitations but have shortness of

breath.



PAST MEDICAL HISTORY:  Significant for:

1.  Hypertension.

2.  History of COPD.

3.  Insulin-dependent diabetes.

4.  History of *------*.

5.  Mild renal insufficiency.

6.  History of melanoma.

7.  History of basal cell carcinoma.



PAST SURGICAL HISTORY:  Significant for skin graft 43 years ago and

multiple skin cancer excisions.



ALLERGIES:  HE IS ALLERGIC TO PENICILLIN, ATORVASTATIN, PROCAINE, DARVON,

AND NOVOCAIN.



SOCIAL HISTORY:  He is an ex-smoker, quit 11 years ago.



MEDICATIONS AT HOME:  He is on Coreg 12.5 twice a day, Flomax 0.4 b.i.d.,

Viagra as needed, Sular *------* daily, glimepiride 2 mg twice a day, Lasix

20 mg twice a day, finasteride 5 mg daily, Zetia 10 mg      daily, Inspra

25 mg daily, Hectorol and bethanechol.



REVIEW OF SYSTEMS:  Significant for diarrhea, cough, congestion, and

shortness of breath.



PHYSICAL EXAMINATION:

GENERAL:  He is awake, alert, oriented, and communicative.

VITAL SIGNS:  He is afebrile, pulse 98, respirations 18, and blood pressure

142/90.

LUNGS:  Bilateral occasional expiratory rhonchi.

HEART:  S1, S2 audible.

ABDOMEN:  Soft, nontender, no rebound, obese.  No hepatosplenomegaly.

NEUROLOGIC:  The patient is awake, alert, oriented, able to communicate.



LABORATORY DATA:  WBC 11.7, hemoglobin 10.2, hematocrit 29.4, platelets of

308.  PT 14.0, INR 1.27.  Chemistry; sodium 138, potassium 3.0, chloride

103, CO2 of 27, BUN 20, creatinine 1.4, blood sugar of 212, BNP 1660.



X-ray of chest shows multifocal extensive patchy opacity throughout the

right hemithorax with sparing of right lung apex.



ASSESSMENT AND PLAN:

1.  Community-acquired pneumonia.

2.  Failed outpatient treatment.

3.  Non-insulin dependent diabetes.

4.  Hypertension.

5.  Hyperlipidemia.

6.  History of chronic obstructive pulmonary disease.

7.  History of cancer of prostate.



PLAN:  We will start him on nebulizer treatments, start him on doxycycline

100 b.i.d., given him Azactam, Dr. *------* for consult and monitor his

blood sugar.  We will reevaluate patient in a.m.





__________________________________________

Audie Pathak MD





DD:  12/18/2017 17:51:49

DT:  12/18/2017 20:38:59

Job # [id number]

## 2017-12-19 NOTE — PN
DATE:  12/19/2017



SUBJECTIVE:  The patient is in bed, no acute distress, nontoxic.



PHYSICAL EXAMINATION:

VITAL SIGNS:  On exam, temperature is 98, blood pressure is 180/80,

respiratory rate of 22.

HEENT:  Unremarkable.

NECK:  Supple.

LUNGS:  Have decreased breath sounds.

HEART:  Normal S1 and S2.

ABDOMEN:  Soft and nontender.



LABORATORY EXAMINATION:  Reveals a white count is down to 10,000,

hemoglobin of 9, platelets of 296.  BUN of 21, creatinine of 1.4, and blood

cultures are no growth.



ASSESSMENT AND PLAN:  This is a 77-year-old male with past medical history

significant for prostate cancer, renal disease, diabetes, hypertension with

a systemic inflammatory response syndrome, awaiting for final culture

results.  Currently, the patient is on Azactam, doxycycline, and vancomycin

p.o.  Pending further workup.  We will follow closely with you.







__________________________________________

Félix Graham MD





DD:  12/19/2017 13:25:25

DT:  12/19/2017 13:27:30

Job # [id number]

## 2017-12-20 VITALS — RESPIRATION RATE: 20 BRPM

## 2017-12-20 LAB
BUN SERPL-MCNC: 21 MG/DL (ref 7–21)
CALCIUM SERPL-MCNC: 8.6 MG/DL (ref 8.4–10.5)
CHLORIDE SERPL-SCNC: 105 MMOL/L (ref 98–107)
CO2 SERPL-SCNC: 25 MMOL/L (ref 21–33)
ERYTHROCYTE [DISTWIDTH] IN BLOOD BY AUTOMATED COUNT: 13.9 % (ref 11.5–14.5)
GLUCOSE SERPL-MCNC: 166 MG/DL (ref 70–110)
HCT VFR BLD CALC: 30.6 % (ref 42–52)
MAGNESIUM SERPL-MCNC: 2 MG/DL (ref 1.7–2.2)
MCH RBC QN AUTO: 27.7 PG (ref 25–35)
MCHC RBC AUTO-ENTMCNC: 33 G/DL (ref 31–37)
MCV RBC AUTO: 83.8 FL (ref 80–105)
PLATELET # BLD: 332 10^3/UL (ref 120–450)
PMV BLD AUTO: 10.8 FL (ref 7–11)
POTASSIUM SERPL-SCNC: 3.6 MMOL/L (ref 3.6–5)
SODIUM SERPL-SCNC: 139 MMOL/L (ref 132–148)
TROPONIN I SERPL-MCNC: 0.03 NG/ML
WBC # BLD AUTO: 12.4 10^3/UL (ref 4.5–11)

## 2017-12-20 RX ADMIN — IPRATROPIUM BROMIDE AND ALBUTEROL SULFATE SCH ML: .5; 3 SOLUTION RESPIRATORY (INHALATION) at 13:22

## 2017-12-20 RX ADMIN — VANCOMYCIN HYDROCHLORIDE SCH MG: 500 INJECTION, POWDER, LYOPHILIZED, FOR SOLUTION INTRAVENOUS at 19:40

## 2017-12-20 RX ADMIN — INSULIN HUMAN SCH UNITS: 100 INJECTION, SOLUTION PARENTERAL at 17:27

## 2017-12-20 RX ADMIN — POTASSIUM CHLORIDE SCH MEQ: 20 TABLET, EXTENDED RELEASE ORAL at 17:28

## 2017-12-20 RX ADMIN — INSULIN HUMAN SCH: 100 INJECTION, SOLUTION PARENTERAL at 09:00

## 2017-12-20 RX ADMIN — INSULIN HUMAN SCH: 100 INJECTION, SOLUTION PARENTERAL at 22:48

## 2017-12-20 RX ADMIN — IPRATROPIUM BROMIDE AND ALBUTEROL SULFATE SCH ML: .5; 3 SOLUTION RESPIRATORY (INHALATION) at 21:26

## 2017-12-20 RX ADMIN — VANCOMYCIN HYDROCHLORIDE SCH MG: 500 INJECTION, POWDER, LYOPHILIZED, FOR SOLUTION INTRAVENOUS at 13:15

## 2017-12-20 RX ADMIN — IPRATROPIUM BROMIDE AND ALBUTEROL SULFATE SCH ML: .5; 3 SOLUTION RESPIRATORY (INHALATION) at 07:32

## 2017-12-20 RX ADMIN — POTASSIUM CHLORIDE SCH MEQ: 20 TABLET, EXTENDED RELEASE ORAL at 09:23

## 2017-12-20 RX ADMIN — INSULIN HUMAN SCH UNITS: 100 INJECTION, SOLUTION PARENTERAL at 12:18

## 2017-12-20 RX ADMIN — AZTREONAM SCH MLS/HR: 1 INJECTION, SOLUTION INTRAVENOUS at 09:19

## 2017-12-20 RX ADMIN — IPRATROPIUM BROMIDE AND ALBUTEROL SULFATE SCH ML: .5; 3 SOLUTION RESPIRATORY (INHALATION) at 01:20

## 2017-12-20 RX ADMIN — VANCOMYCIN HYDROCHLORIDE SCH MG: 500 INJECTION, POWDER, LYOPHILIZED, FOR SOLUTION INTRAVENOUS at 21:00

## 2017-12-20 RX ADMIN — VANCOMYCIN HYDROCHLORIDE SCH MG: 500 INJECTION, POWDER, LYOPHILIZED, FOR SOLUTION INTRAVENOUS at 09:25

## 2017-12-20 RX ADMIN — AZTREONAM SCH MLS/HR: 1 INJECTION, SOLUTION INTRAVENOUS at 21:00

## 2017-12-20 NOTE — CARD
--------------- APPROVED REPORT --------------





EXAM: Two-dimensional and M-mode echocardiogram with Doppler and 

color Doppler.



INDICATION

Dyspnea 



2D DIMENSIONS 

Left Atrium (2D)4.6   (1.6-4.0cm)IVSd1.5   (0.7-1.1cm)

LVDd4.9   (3.9-5.9cm)LVOT Diameter2.3   (1.8-2.4cm)

PWd1.4   (0.7-1.1cm)LVDs3.3   (2.5-4.0cm)

FS (%) 32.6   %LVEF (%)60.6   (>50%)



M-Mode DIMENSIONS 

Aortic Root3.80   (2.2-3.7cm)Aortic Cusp Exc.1.60   (1.5-2.0cm)



Aortic Valve

AoV Peak Yxsswkya026.0cm/sAoV VTI48.2cmAO Peak GR.30mmHg

LVOT Peak Phjvknao620.0cm/sLVOT VTI27.00cmAO Mean GR.13mmHg

VALERY (VMAX)1.88wi4MZY (VTI)2.91jg3ZW P 1/2 Zxhf800az



Mitral Valve

MV E Zlwhkjmt225.0cm/sMV A Yghmhgak046.0cm/sE/A ratio1.0



TDI

Lateral E' Peak V7.12cm/sMedial E' Peak V6.04cm/sE/Lateral E'17.6

E/Medial E'20.7



Pulmonary Valve

PV Peak Hbktgngv88.3cm/sPV Peak Grad.3mmHg



Tricuspid Valve

TR Peak Bmxydelx789sg/sRAP AGMPNSFC95fyLdCG Peak Gr.59mmHg

OVLA92aoLc



 LEFT VENTRICLE 

The left ventricle is normal size. There is mild to moderate 

concentric left ventricular hypertrophy. The left ventricular 

function is normal. The left ventricular ejection fraction is within 

the normal range. There is normal LV segmental wall motion.



 RIGHT VENTRICLE 

The right ventricle is normal size. The right ventricular systolic 

function is normal.



 ATRIA 

The left atrium is moderately dilated. The right atrium is moderately 

dilated. The interatrial septum is intact with no evidence for an 

atrial septal defect.



 AORTIC VALVE 

The aortic valve is moderately calcified. There is mild aortic 

regurgitation. There is mild valvular aortic stenosis.



 MITRAL VALVE 

Mitral annular calcification is mild. Mitral regurgitation is trace.



 TRICUSPID VALVE 

The tricuspid valve is normal in structure. There is moderate 

tricuspid regurgitation. There is moderate pulmonary hypertension.



 PULMONIC VALVE 

The pulmonary valve is normal in structure.



 GREAT VESSELS 

The aortic root is normal in size. The IVC is normal in size and 

collapses >50% with inspiration.



 PERICARDIAL EFFUSION 

There is no pleural effusion. There is no pericardial effusion.



<Conclusion>

Biatrial enlargement.

Mild to moderate concentric LVH.

Normal LV systolic function.

Mild AS and AI.

Moderate TR.

## 2017-12-20 NOTE — PN
DATE:  12/20/2017



SUBJECTIVE:  The patient is seen lying in bed on 5R.  He is feeling

somewhat better.  His dyspnea is improved.  His latest electrocardiogram

reveals sinus rhythm.  His current medications include Amaryl, aspirin,

Azactam, carvedilol 12.5 mg b.i.d., Diovan 320 mg daily, doxycycline,

DuoNeb inhaler, Ecotrin, Eliquis 5 mg b.i.d., Flomax, potassium, Lasix 40

mg daily, Norvasc 10 mg daily, Proscar, Protonix, oral vancomycin, Zetia 10

mg daily.



OBJECTIVE:

GENERAL:  He is an elderly man who appears comfortable at rest.

VITAL SIGNS:  Blood pressure 142/82 with pulse of 86 and regular,

respirations 16.  He is afebrile.

HEENT:  No JVD.

CHEST:  Few scattered rhonchi are heard.

HEART: PMI was normal position with a soft systolic murmur noted at the

base as well as the lower left sternal border.

ABDOMEN:  Soft and nontender with normoactive bowel sounds.

EXTREMITIES:  Revealed no edema.



DIAGNOSTIC DATA:   Troponin is negative.  Potassium 3.6, BUN and creatinine

21 and 1.3, white count 12.4, hemoglobin and hematocrit 10.1 and 30.6 with

platelet count of 332,000.



IMPRESSION:

1.  Recent pneumonia, clinically improved.

2.  Paroxysmal atrial fibrillation, currently on Eliquis.

3.  Mild aortic stenosis.



RECOMMENDATIONS:   Current medications should continue for now.  If he has

no further evidence of atrial fibrillation, consideration could be given to

discontinuation of Eliquis at some point in the future.  We will continue

to follow and make further recommendations as appropriate.





__________________________________________

Dustin Acosta MD





DD:  12/20/2017 12:54:48

DT:  12/20/2017 12:58:03

Job # [id number]

## 2017-12-21 LAB
ERYTHROCYTE [DISTWIDTH] IN BLOOD BY AUTOMATED COUNT: 13.9 % (ref 11.5–14.5)
HCT VFR BLD CALC: 29.2 % (ref 42–52)
MCH RBC QN AUTO: 27.3 PG (ref 25–35)
MCHC RBC AUTO-ENTMCNC: 32.5 G/DL (ref 31–37)
MCV RBC AUTO: 83.9 FL (ref 80–105)
PLATELET # BLD: 292 10^3/UL (ref 120–450)
PMV BLD AUTO: 10.6 FL (ref 7–11)
WBC # BLD AUTO: 11.5 10^3/UL (ref 4.5–11)

## 2017-12-21 RX ADMIN — IPRATROPIUM BROMIDE AND ALBUTEROL SULFATE SCH ML: .5; 3 SOLUTION RESPIRATORY (INHALATION) at 13:37

## 2017-12-21 RX ADMIN — IPRATROPIUM BROMIDE AND ALBUTEROL SULFATE SCH: .5; 3 SOLUTION RESPIRATORY (INHALATION) at 02:31

## 2017-12-21 RX ADMIN — POTASSIUM CHLORIDE SCH MEQ: 10 TABLET, FILM COATED, EXTENDED RELEASE ORAL at 10:31

## 2017-12-21 RX ADMIN — INSULIN HUMAN SCH UNITS: 100 INJECTION, SOLUTION PARENTERAL at 13:24

## 2017-12-21 RX ADMIN — IPRATROPIUM BROMIDE AND ALBUTEROL SULFATE SCH ML: .5; 3 SOLUTION RESPIRATORY (INHALATION) at 21:00

## 2017-12-21 RX ADMIN — INSULIN HUMAN SCH: 100 INJECTION, SOLUTION PARENTERAL at 10:32

## 2017-12-21 RX ADMIN — INSULIN HUMAN SCH UNITS: 100 INJECTION, SOLUTION PARENTERAL at 17:22

## 2017-12-21 RX ADMIN — IPRATROPIUM BROMIDE AND ALBUTEROL SULFATE SCH ML: .5; 3 SOLUTION RESPIRATORY (INHALATION) at 07:24

## 2017-12-21 RX ADMIN — INSULIN HUMAN SCH: 100 INJECTION, SOLUTION PARENTERAL at 22:07

## 2017-12-21 RX ADMIN — POTASSIUM CHLORIDE SCH MEQ: 10 TABLET, FILM COATED, EXTENDED RELEASE ORAL at 17:57

## 2017-12-21 NOTE — CP.PCM.PN
Subjective





- Date & Time of Evaluation


Date of Evaluation: 12/21/17


Time of Evaluation: 07:00





- Subjective


Subjective: 





Stable on 5R. He feels better. No chest pain or SOB at rest. 





V/S noted. P = reg at 80





PE:





Lungs: rhonchi


Cor: S1S2, sys. murmur


Abd.: soft


Ext.: no edema


Neuro.: alert





I/O= 1180/400 recorded





BC X2 NG at 48 hrs.





Echo: Nl LV, Mod. LVH, Mild AS and AI, Mod. TR





Objective





- Vital Signs/Intake and Output


Vital Signs (last 24 hours): 


 











Temp Pulse Resp BP Pulse Ox


 


 97.6 F   86   20   191/93 H  94 L


 


 12/20/17 17:13  12/20/17 17:26  12/20/17 17:13  12/20/17 17:26  12/20/17 17:13








Intake and Output: 


 











 12/21/17 12/21/17





 06:59 18:59


 


Intake Total 820 


 


Output Total 400 


 


Balance 420 














- Medications


Medications: 


 Current Medications





Acetaminophen (Tylenol 325mg Tab)  650 mg PO Q6H PRN


   PRN Reason: Fever >100.4 F


Albuterol/Ipratropium (Duoneb 3 Mg/0.5 Mg (3 Ml) Ud)  3 ml IH Q2H PRN


   PRN Reason: Shortness of Breath


Albuterol/Ipratropium (Duoneb 3 Mg/0.5 Mg (3 Ml) Ud)  3 ml IH [id number] [facility]


   Last Admin: 12/21/17 07:24 Dose:  3 ml


Amlodipine Besylate (Norvasc)  10 mg PO DAILY [facility]


   Last Admin: 12/20/17 09:23 Dose:  10 mg


Apixaban (Eliquis)  5 mg PO BID [facility]


   PRN Reason: Protocol


   Last Admin: 12/20/17 17:27 Dose:  5 mg


Aspirin (Aspirin Chewable)  81 mg PO DAILY [facility]


   Last Admin: 12/20/17 09:20 Dose:  81 mg


Bethanechol Chloride (Urecholine)  25 mg PO TID [facility]


   Last Admin: 12/20/17 17:28 Dose:  25 mg


Carvedilol (Coreg)  12.5 mg PO BID [facility]


   Last Admin: 12/20/17 17:26 Dose:  12.5 mg


Doxercalciferol (Hectorol)  2.5 mcg PO DAILY [facility]


   Last Admin: 12/20/17 09:22 Dose:  2.5 mcg


Doxycycline Hyclate (Doryx)  100 mg PO Q12 [facility]


   Last Admin: 12/20/17 21:00 Dose:  100 mg


Ezetimibe (Zetia)  10 mg PO DAILY [facility]


   Last Admin: 12/20/17 09:25 Dose:  10 mg


Finasteride (Proscar)  5 mg PO DAILY [facility]


   Last Admin: 12/20/17 09:24 Dose:  5 mg


Furosemide (Lasix)  40 mg IVP DAILY [facility]


Glimepiride (Amaryl)  2 mg PO BID [facility]


   Last Admin: 12/20/17 17:26 Dose:  2 mg


Insulin Human Regular (Humulin R Med)  0 units SC ACHS [facility]


   PRN Reason: Protocol


   Last Admin: 12/20/17 22:48 Dose:  Not Given


Ondansetron HCl (Zofran Inj)  4 mg IVP Q6H PRN


   PRN Reason: Nausea/Vomiting


Pantoprazole Sodium (Protonix Ec Tab)  40 mg PO 0630 [facility]


   Last Admin: 12/19/17 05:38 Dose:  40 mg


Potassium Chloride (K-Dur 20 Meq Er Tab)  20 meq PO BID [facility]


   Last Admin: 12/20/17 17:28 Dose:  20 meq


Tamsulosin HCl (Flomax)  0.4 mg PO BID [facility]


   Last Admin: 12/20/17 17:27 Dose:  0.4 mg


Valsartan (Diovan)  320 mg PO DAILY [facility]


   Last Admin: 12/20/17 09:21 Dose:  320 mg











- Labs


Labs: 


 





 12/20/17 06:45 





 12/20/17 06:45 





 











PT  14.0 SECONDS (9.4-12.5)  H  12/18/17  12:49    


 


INR  1.27  (0.93-1.08)  H  12/18/17  12:49    


 


APTT  29.9 Seconds (25.1-36.5)   12/18/17  12:49    














Assessment and Plan





- Assessment and Plan (Free Text)


Assessment: 





SOB/Pneumonia


Diarrhea


PAF


HBP


Diabetes


COPD/Former Smoker


BPH


CKD


Gout


Echo: Mild AS and AI, Mod. TR








Plan:





AB


OOB/Ambulate as flor


IV > PO Lasix


F/U CXR Friday


Continue Eliquis for now.

## 2017-12-21 NOTE — PN
DATE:  12/21/2017



SUBJECTIVE:  The patient is in bed, in no acute distress, nontoxic.



PHYSICAL EXAMINATION:

VITAL SIGNS:  Temperature is 98, blood pressure is 150/80, respiratory rate

of 20.

HEENT:  Unremarkable.

NECK:  Supple.

LUNGS:  Have decreased breath sounds.

HEART:  Normal S1, S2.

ABDOMEN:  Soft, nontender.



LABORATORY DATA:  Reveals a white count is 11,500, hemoglobin of 9.  BUN of

21, creatinine of 1.3, procalcitonin is 0.15.  Urinalysis is noted. 

Microbiology reveals stool C. diff is negative.  Urine cultures negative. 

Blood cultures negative.  Sputum culture is negative.



ASSESSMENT AND PLAN  This is a 77-year-old male with past medical history

of prostate cancer, renal disease, diabetes, systemic inflammatory response

syndrome, negative cultures, negative urine cultures, negative urinalysis,

negative procalcitonin, currently on p.o. doxycycline.  Stool Clostridium

difficile is negative and p.o. vancomycin was discontinued.  We will check

on the urine for Legionella antigen, is pending.





__________________________________________

Félix Graham MD



DD:  12/21/2017 17:33:22

DT:  12/21/2017 17:35:47

Job # [id number]

## 2017-12-21 NOTE — PN
SUBJECTIVE:  Patient is 77 years old seen and examined, states feels

better.  No nausea, vomiting, no diarrhea.  Eating and tolerating. 

Complaining of constipation.



PHYSICAL EXAMINATION

VITAL SIGNS:  He is afebrile.  Pulse 85, respirations 20, blood pressure

150/80.

LUNGS:  Bilateral fair airflow.  Few expiratory rhonchi bilaterally.

HEART:  S1 and S2 audible.

ABDOMEN:  Soft, nontender, obese.  No hepatosplenomegaly.

NEUROLOGIC:  He is awake, alert, oriented.  Able to communicate.



LABORATORY EXAM:  WBC 11.5, hemoglobin 9.5, hematocrit 29.2, platelets of

292.  Chemistry, blood sugar is 268.  Blood culture and urine cultures are

negative.  Stool for C. difficile is negative.



ASSESSMENT:

1.  Multilobar pneumonia.

2.  Diarrhea secondary to antibiotic.

3.  History of chronic kidney disease.

4.  History of cancer of prostate.

5.  Paroxysmal atrial fibrillation.

6.  Insulin-dependent diabetes.

7.  Chronic obstructive pulmonary disease.

8.  History of heavy smoking in the past.



PLAN:  Currently, patient is on Amaryl 2 mg twice a day, and I will

increase it to 4 mg twice a day.  Continue aspirin and carvedilol.  We will

continue on valsartan.  He is on doxycycline currently and we will follow

up this patient.  Patient has been referred to TCU and if the bed is

available, patient will be transferred to TCU.





__________________________________________

Audie Pathak MD





DD:  12/21/2017 15:52:04

DT:  12/21/2017 18:35:18

Job # [id number]

## 2017-12-21 NOTE — PN
DATE:  12/20/2017



SUBJECTIVE:  The patient is 77-year-old, seen and examined, sitting in

chair, states he feels much better.  Less shortness of breath.  Cough is

improving.  No fever or chills.



PHYSICAL EXAMINATION:

VITAL SIGNS:  The patient is afebrile, pulse 88, respirations 22, blood

pressure 142/82.

LUNGS:  Bilateral few expiratory rhonchi.

HEART:  S1 and S2, audible.

ABDOMEN:  Soft, obese, nontender.  No rebound.  No guarding.

NEUROLOGIC:  He is awake, alert, and oriented, communicative.



LABORATORY EXAMINATION:  WBC is 12.4, hemoglobin 10, hematocrit 30,

platelets of 332.  Chemistry:  Sodium 139, potassium 3.6, chloride 105, CO2

of 25, BUN 21, creatinine 1.3, blood sugar of 166.  His blood culture and

urine cultures are negative.  Stool for C. diff is negative.  He had

echocardiogram done also that shows biatrial enlargement, mild-to-moderate

concentric LVH, normal LV function, moderate TR.



ASSESSMENT AND PLAN:

1.  Multilobar pneumonia.

2.  Failed outpatient treatment.

3.  Congestive heart failure.

4.  Active smoker.

5.  Paroxysmal atrial fibrillation, on Eliquis.

6.  Aortic stenosis.



PLAN:  Currently, the patient is on Azactam.  We will continue Eliquis and

he is on p.o. vancomycin.  Continue him on doxycycline, and we will

reevaluate the patient in a.m.





__________________________________________

Audie Pathak MD



DD:  12/20/2017 16:09:42

DT:  12/20/2017 18:56:06

Job # [id number]

## 2017-12-21 NOTE — CARD
--------------- APPROVED REPORT --------------





EKG Measurement

Heart [id number]

KS [id number]

[id number]

[id number]

[id number]



<Conclusion>

Normal sinus rhythm

Left anterior fascicular block

PRWP

IVCD

Prolonged QTc

No change

## 2017-12-21 NOTE — CON
DATE:



LOCATION:  The patient is in room 565.



CHIEF COMPLAINT:  Abdominal pain and diarrhea times several days.



HISTORY OF PRESENT ILLNESS:  This is a 77-year-old male with obesity, BMI

of 34 with hypertension, diabetes, chronic obstructive lung disease,

prostate cancer, renal insufficiency, history of right arm surgery, and

tonsillectomy.  HE IS ALLERGIC TO PENICILLIN, was admitted with a diagnosis

of pneumonia.  Infectious Disease consultation requested.  The patient

states that he had pneumonia which has been treated with Levaquin for a

week and he developed diarrhea with it and weakness.  He is denying any

fevers at this time.  He did have fevers at home.  No chills.  No chest

pain.  Mild abdominal cramps.



PAST MEDICAL HISTORY:  Significant for hypertension, diabetes mellitus,

chronic obstructive lung disease, prostate cancer, renal insufficiency,

tonsillectomy, and right arm surgery.



PAST SURGICAL HISTORY:  Significant for tonsillectomy and right arm

surgery.



ALLERGIES:  INCLUDE PENICILLIN, QUESTIONABLE RASH.



MEDICATIONS:  Reviewed which include Avapro, _____, Prandin, Coreg, Flomax,

Zetia, aspirin, and colchicine.



PHYSICAL EXAMINATION:

GENERAL:  The patient is in bed.

VITAL SIGNS:  Temperature of 98, heart rate of 114, respiratory rate of 18,

blood pressure is 140/_____.

HEENT:  Unremarkable.

NECK:  Supple.

LUNGS:  Have decreased breath sounds.

HEART:  Normal S1 and S2.

ABDOMEN:  Soft and nontender.  No organomegaly.  No rebound, no guarding,

no masses.



LABORATORY EXAMINATION:  Reveals a white count of 11,700, hemoglobin of 10,

and platelets of 308.  Coagulation is noted.  Chemistry reveals a BUN of 20

and creatinine of 1.4.  BNP is 8660.  Glucose is 215.  Microbiology reveals

blood cultures, no growth.  Chest x-ray, extensive infiltrate.



ASSESSMENT AND PLAN:  A 77-year-old male, hypertension, diabetes, chronic

obstructive lung disease, prostate cancer, and renal insufficiency with

ALLERGY TO PENICILLIN, treated with one week of Levaquin as outpatient and

now with community-acquired pneumonia, rule out pseudomembranous colitis,

on Azactam and doxycycline pending cultures and initial workup results

including procalcitonin, urine for Legionella, stool for Clostridium

difficile, and sputum culture.  We will follow with you.









__________________________________________

Félix Graham MD





DD:  12/18/2017 18:19:18

DT:  12/19/2017 1:19:58

Job # [id number]

## 2017-12-21 NOTE — PN
DATE:  12/20/2017



SUBJECTIVE:  The patient is seen earlier this morning.  No acute distress. 

Nontoxic.



PHYSICAL EXAMINATION:

VITAL SIGNS:  Temperature is 98, blood pressure is 190/90, respiratory rate

of 20, heart rate of 86.  The patient is 94% saturation on exam.

HEENT:  Unremarkable.

NECK:  Supple.

LUNGS:  Have decreased breath sounds.

HEART:  Normal S1 and S2.

ABDOMEN:  Soft, nontender.



LABORATORY DATA:  There is a white count of 11,700, hemoglobin of 10,

platelets of 308.  BUN of 21, creatinine of 1.3.  Procalcitonin is 0.15. 

Urinalysis is noted to be unremarkable.  Microbiology reveals the blood

cultures have no growth, urine cultures have no growth.  C. diff antigen

and toxin are negative.



ASSESSMENT AND PLAN:  A 77-year-old male with past medical history

significant for prostate cancer, renal disease, diabetes with systemic

inflammatory response syndrome and with negative blood cultures, negative

urine cultures and a negative urinalysis and negative procalcitonin of

0.12.  We will discontinue the aztreonam.  The patient is on doxycycline

p.o. and p.o. vancomycin will also be discontinued since the Clostridium

difficile toxin and antigen are both negative.  Highly unlikely to be

pseudomembranous colitis.  We will check on the urine for Legionella

antigen.  We will follow closely with you.



__________________________________________

Félix Graham MD





DD:  12/20/2017 22:04:06

DT:  12/20/2017 22:22:14

Job # [id number]

## 2017-12-22 ENCOUNTER — HOSPITAL ENCOUNTER (INPATIENT)
Dept: HOSPITAL 42 - TRCU | Age: 77
LOS: 8 days | Discharge: HOME | DRG: 194 | End: 2017-12-30
Attending: INTERNAL MEDICINE | Admitting: INTERNAL MEDICINE
Payer: COMMERCIAL

## 2017-12-22 VITALS — TEMPERATURE: 98.4 F | OXYGEN SATURATION: 94 % | HEART RATE: 86 BPM

## 2017-12-22 VITALS — SYSTOLIC BLOOD PRESSURE: 132 MMHG | DIASTOLIC BLOOD PRESSURE: 68 MMHG

## 2017-12-22 VITALS — BODY MASS INDEX: 34 KG/M2

## 2017-12-22 DIAGNOSIS — I48.2: ICD-10-CM

## 2017-12-22 DIAGNOSIS — R19.7: ICD-10-CM

## 2017-12-22 DIAGNOSIS — Z85.46: ICD-10-CM

## 2017-12-22 DIAGNOSIS — I10: ICD-10-CM

## 2017-12-22 DIAGNOSIS — Z88.0: ICD-10-CM

## 2017-12-22 DIAGNOSIS — R65.10: ICD-10-CM

## 2017-12-22 DIAGNOSIS — D64.9: ICD-10-CM

## 2017-12-22 DIAGNOSIS — R09.02: ICD-10-CM

## 2017-12-22 DIAGNOSIS — T36.95XA: ICD-10-CM

## 2017-12-22 DIAGNOSIS — E78.5: ICD-10-CM

## 2017-12-22 DIAGNOSIS — K22.70: ICD-10-CM

## 2017-12-22 DIAGNOSIS — Z79.01: ICD-10-CM

## 2017-12-22 DIAGNOSIS — N28.9: ICD-10-CM

## 2017-12-22 DIAGNOSIS — E66.01: ICD-10-CM

## 2017-12-22 DIAGNOSIS — J44.0: ICD-10-CM

## 2017-12-22 DIAGNOSIS — F17.200: ICD-10-CM

## 2017-12-22 DIAGNOSIS — I48.0: ICD-10-CM

## 2017-12-22 DIAGNOSIS — E11.9: ICD-10-CM

## 2017-12-22 DIAGNOSIS — J18.9: Primary | ICD-10-CM

## 2017-12-22 DIAGNOSIS — K92.2: ICD-10-CM

## 2017-12-22 DIAGNOSIS — Z85.820: ICD-10-CM

## 2017-12-22 LAB
BUN SERPL-MCNC: 24 MG/DL (ref 7–21)
CALCIUM SERPL-MCNC: 8.4 MG/DL (ref 8.4–10.5)
CHLORIDE SERPL-SCNC: 104 MMOL/L (ref 98–107)
CO2 SERPL-SCNC: 28 MMOL/L (ref 21–33)
GLUCOSE SERPL-MCNC: 168 MG/DL (ref 70–110)
POTASSIUM SERPL-SCNC: 4.2 MMOL/L (ref 3.6–5)
SODIUM SERPL-SCNC: 138 MMOL/L (ref 132–148)

## 2017-12-22 RX ADMIN — INSULIN HUMAN SCH UNITS: 100 INJECTION, SOLUTION PARENTERAL at 11:37

## 2017-12-22 RX ADMIN — POTASSIUM CHLORIDE SCH: 10 TABLET, FILM COATED, EXTENDED RELEASE ORAL at 18:55

## 2017-12-22 RX ADMIN — IPRATROPIUM BROMIDE AND ALBUTEROL SULFATE SCH: .5; 3 SOLUTION RESPIRATORY (INHALATION) at 02:00

## 2017-12-22 RX ADMIN — IPRATROPIUM BROMIDE AND ALBUTEROL SULFATE SCH ML: .5; 3 SOLUTION RESPIRATORY (INHALATION) at 07:59

## 2017-12-22 RX ADMIN — INSULIN HUMAN SCH UNITS: 100 INJECTION, SOLUTION PARENTERAL at 17:12

## 2017-12-22 RX ADMIN — IPRATROPIUM BROMIDE AND ALBUTEROL SULFATE SCH ML: .5; 3 SOLUTION RESPIRATORY (INHALATION) at 19:24

## 2017-12-22 RX ADMIN — PANTOPRAZOLE SODIUM SCH MG: 40 TABLET, DELAYED RELEASE ORAL at 05:27

## 2017-12-22 RX ADMIN — INSULIN HUMAN SCH UNITS: 100 INJECTION, SOLUTION PARENTERAL at 08:14

## 2017-12-22 RX ADMIN — IPRATROPIUM BROMIDE AND ALBUTEROL SULFATE SCH ML: .5; 3 SOLUTION RESPIRATORY (INHALATION) at 13:09

## 2017-12-22 RX ADMIN — POTASSIUM CHLORIDE SCH MEQ: 10 TABLET, FILM COATED, EXTENDED RELEASE ORAL at 09:15

## 2017-12-22 RX ADMIN — PANTOPRAZOLE SODIUM SCH MG: 40 TABLET, DELAYED RELEASE ORAL at 05:28

## 2017-12-22 RX ADMIN — POTASSIUM CHLORIDE SCH: 10 TABLET, FILM COATED, EXTENDED RELEASE ORAL at 09:23

## 2017-12-22 RX ADMIN — INSULIN HUMAN SCH: 100 INJECTION, SOLUTION PARENTERAL at 21:57

## 2017-12-22 NOTE — RAD
HISTORY:

pneumonia, F/U cxr  



COMPARISON:

Chest radiograph dated 12/18/2017.



TECHNIQUE:

Chest PA and lateral



FINDINGS:



LUNGS:

Similar appearance of patchy right upper and middle lobe infiltrates. 



PLEURA:

Small right pleural effusion. No pneumothorax apparent.



CARDIOVASCULAR:

Unchanged



OSSEOUS STRUCTURES:

Unchanged.



VISUALIZED UPPER ABDOMEN:

Normal.



OTHER FINDINGS:

None.



IMPRESSION:

No significant change in appearance of patchy right upper and middle 

lobe infiltrates.  Small right pleural effusion.

## 2017-12-22 NOTE — IP.NPCORE
Pneumonia Progress Notes





- Oxygenation Assessment (REQUIRED)


Documented 02: Yes


O2 Saturation: 90


Oxygen Delivery Method: Nasal Cannula


Date: 12/19/17


Time: 16:00


Documented P02: Yes


Date:: 12/18/17


Time:: 12:49





- Blood Cultures (REQUIRED)


Culture drawn: Yes


Date:: 12/18/17


Time:: 12:49





- Initial Antibiotic


Initial Antibiotic given within Four Hours:: Yes (Levaquin)





- Appropriate Antibiotic


Appropriate Antibiotic within 24 hours of Admission:: Yes (Doxycycline, Azactam)


Current Antibiotic: Doxycycline





- Smoking Cessation


Ex-Smoker (has not smoked in the last 12 months): Yes

## 2017-12-22 NOTE — CP.PCM.PN
Subjective





- Date & Time of Evaluation


Date of Evaluation: 12/22/17


Time of Evaluation: 07:00





- Subjective


Subjective: 





Stable on 5R. He feels better. No chest pain or SOB at rest. Ambulated 

yesterday.





V/S noted. P = reg at 80





PE:





Lungs: rhonchi


Cor: S1S2, sys. murmur


Abd.: soft


Ext.: no edema


Neuro.: alert





I/O= 1020/200 recorded





BC X2 NG at 3 days





Echo: Nl LV, Mod. LVH, Mild AS and AI, Mod. TR





ECG 12/21: RSR








Objective





- Vital Signs/Intake and Output


Vital Signs (last 24 hours): 


 











Temp Pulse Resp BP Pulse Ox


 


 98.0 F   88   20   110/60   93 L


 


 12/21/17 07:30  12/21/17 17:58  12/21/17 07:30  12/21/17 17:58  12/21/17 07:30








Intake and Output: 


 











 12/22/17 12/22/17





 06:59 18:59


 


Intake Total 1020 


 


Output Total 200 


 


Balance 820 














- Medications


Medications: 


 Current Medications





Acetaminophen (Tylenol 325mg Tab)  650 mg PO Q6H PRN


   PRN Reason: Fever >100.4 F


Albuterol/Ipratropium (Duoneb 3 Mg/0.5 Mg (3 Ml) Ud)  3 ml IH Q2H PRN


   PRN Reason: Shortness of Breath


Albuterol/Ipratropium (Duoneb 3 Mg/0.5 Mg (3 Ml) Ud)  3 ml IH [id number] [facility]


   Last Admin: 12/22/17 02:00 Dose:  Not Given


Amlodipine Besylate (Norvasc)  10 mg PO DAILY [facility]


   Last Admin: 12/21/17 10:32 Dose:  10 mg


Apixaban (Eliquis)  5 mg PO BID [facility]


   PRN Reason: Protocol


   Last Admin: 12/21/17 17:58 Dose:  5 mg


Aspirin (Aspirin Chewable)  81 mg PO DAILY [facility]


   Last Admin: 12/21/17 10:31 Dose:  81 mg


Bethanechol Chloride (Urecholine)  25 mg PO TID [facility]


   Last Admin: 12/21/17 17:58 Dose:  25 mg


Carvedilol (Coreg)  12.5 mg PO BID [facility]


   Last Admin: 12/21/17 17:58 Dose:  12.5 mg


Doxercalciferol (Hectorol)  2.5 mcg PO DAILY [facility]


   Last Admin: 12/21/17 10:30 Dose:  2.5 mcg


Doxycycline Hyclate (Doryx)  100 mg PO Q12 [facility]


   Last Admin: 12/21/17 21:03 Dose:  100 mg


Ezetimibe (Zetia)  10 mg PO DAILY [facility]


   Last Admin: 12/21/17 10:31 Dose:  10 mg


Finasteride (Proscar)  5 mg PO DAILY [facility]


   Last Admin: 12/21/17 10:31 Dose:  5 mg


Furosemide (Lasix)  40 mg PO DAILY [facility]


   Last Admin: 12/21/17 10:32 Dose:  40 mg


Glimepiride (Amaryl)  4 mg PO BID [facility]


   Last Admin: 12/21/17 17:58 Dose:  4 mg


Insulin Human Regular (Humulin R Med)  0 units SC ACHS [facility]


   PRN Reason: Protocol


   Last Admin: 12/21/17 22:07 Dose:  Not Given


Ondansetron HCl (Zofran Inj)  4 mg IVP Q6H PRN


   PRN Reason: Nausea/Vomiting


Pantoprazole Sodium (Protonix Ec Tab)  40 mg PO 0630 [facility]


   Last Admin: 12/22/17 05:28 Dose:  40 mg


Potassium Chloride (Klor-Con 10)  30 meq PO BID [facility]


   Last Admin: 12/21/17 17:57 Dose:  30 meq


Tamsulosin HCl (Flomax)  0.4 mg PO BID [facility]


   Last Admin: 12/21/17 17:57 Dose:  0.4 mg


Valsartan (Diovan)  320 mg PO DAILY [facility]


   Last Admin: 12/21/17 10:32 Dose:  320 mg











- Labs


Labs: 


 





 12/21/17 09:10 





 12/22/17 06:20 





 











PT  14.0 SECONDS (9.4-12.5)  H  12/18/17  12:49    


 


INR  1.27  (0.93-1.08)  H  12/18/17  12:49    


 


APTT  29.9 Seconds (25.1-36.5)   12/18/17  12:49    














Assessment and Plan





- Assessment and Plan (Free Text)


Assessment: 





SOB/Pneumonia


Diarrhea


PAF


HBP


Diabetes


COPD/Former Smoker


BPH


CKD


Gout


Prostate cancer


Echo: Mild AS and AI, Mod. TR








Plan:





AB


OOB/Ambulate as flor


 PO Lasix


F/U CXR today


Continue Eliquis for now.


TCU Eval.


As per ID and Dr. Pathak

## 2017-12-23 PROCEDURE — F07M6ZZ THERAPEUTIC EXERCISE TREATMENT OF MUSCULOSKELETAL SYSTEM - WHOLE BODY: ICD-10-PCS | Performed by: INTERNAL MEDICINE

## 2017-12-23 PROCEDURE — F08Z2ZZ GROOMING/PERSONAL HYGIENE TREATMENT: ICD-10-PCS | Performed by: INTERNAL MEDICINE

## 2017-12-23 PROCEDURE — F07Z9ZZ GAIT TRAINING/FUNCTIONAL AMBULATION TREATMENT: ICD-10-PCS | Performed by: INTERNAL MEDICINE

## 2017-12-23 PROCEDURE — F08Z1ZZ DRESSING TECHNIQUES TREATMENT: ICD-10-PCS | Performed by: INTERNAL MEDICINE

## 2017-12-23 RX ADMIN — IPRATROPIUM BROMIDE AND ALBUTEROL SULFATE SCH: .5; 3 SOLUTION RESPIRATORY (INHALATION) at 07:40

## 2017-12-23 RX ADMIN — INSULIN HUMAN SCH: 100 INJECTION, SOLUTION PARENTERAL at 07:40

## 2017-12-23 RX ADMIN — INSULIN HUMAN SCH: 100 INJECTION, SOLUTION PARENTERAL at 22:16

## 2017-12-23 RX ADMIN — PANTOPRAZOLE SODIUM SCH MG: 40 TABLET, DELAYED RELEASE ORAL at 06:09

## 2017-12-23 RX ADMIN — INSULIN HUMAN SCH UNITS: 100 INJECTION, SOLUTION PARENTERAL at 12:18

## 2017-12-23 RX ADMIN — INSULIN HUMAN SCH UNITS: 100 INJECTION, SOLUTION PARENTERAL at 17:11

## 2017-12-23 RX ADMIN — POTASSIUM CHLORIDE SCH MEQ: 10 TABLET, FILM COATED, EXTENDED RELEASE ORAL at 17:41

## 2017-12-23 RX ADMIN — POTASSIUM CHLORIDE SCH MEQ: 10 TABLET, FILM COATED, EXTENDED RELEASE ORAL at 08:55

## 2017-12-23 RX ADMIN — IPRATROPIUM BROMIDE AND ALBUTEROL SULFATE SCH: .5; 3 SOLUTION RESPIRATORY (INHALATION) at 02:13

## 2017-12-23 RX ADMIN — IPRATROPIUM BROMIDE AND ALBUTEROL SULFATE SCH ML: .5; 3 SOLUTION RESPIRATORY (INHALATION) at 19:41

## 2017-12-23 RX ADMIN — IPRATROPIUM BROMIDE AND ALBUTEROL SULFATE SCH ML: .5; 3 SOLUTION RESPIRATORY (INHALATION) at 06:25

## 2017-12-23 RX ADMIN — IPRATROPIUM BROMIDE AND ALBUTEROL SULFATE SCH ML: .5; 3 SOLUTION RESPIRATORY (INHALATION) at 14:26

## 2017-12-23 NOTE — CON
DATE:  12/23/2017



CHIEF COMPLAINT:  Weakness times several days.



HISTORY OF PRESENT ILLNESS:  This is a 77-year-old male with obesity with

BMI of 34, hypertension, diabetes mellitus, chronic obstructive lung

disease, prostate cancer, renal insufficiency, right arm surgery,

tonsillectomy, who is allergic to PENICILLIN, who was admitted with a

diagnosis of pneumonia due to acute care.  The patient is now transferred

to transitional care room 313 for physical therapy, and the patient was

found to have negative C. diff, negative urine for Legionella antigen,

negative blood cultures and at this time, he denies any fevers, any chills.

No nausea, no vomiting.



PAST MEDICAL HISTORY:  Significant for obesity, BMI of 34, hypertension,

diabetes, chronic obstructive lung disease, prostate cancer, renal

insufficiency.



PAST SURGICAL HISTORY:  Significant for right arm surgery, tonsillectomy.



ALLERGIES:  THE PATIENT IS ALLERGIC TO PENICILLIN.



PHYSICAL EXAMINATION:

VITAL SIGNS:  The patient's temperature is 98, blood pressure is 180/80,

respiratory rate 22, heart rate of 93.

HEENT:  Unremarkable.

NECK:  Supple.

LUNGS:  Have decreased breath sounds.

HEART:  Normal S1, S2.

ABDOMEN:  Soft, nontender.  No rebound or guarding.



LABORATORY:  Reveals a white count of 12,400, down to 11,500 today and the

coagulation is noted and BUN of 24, creatinine of 1.2.  Procalcitonin is

0.15 and laboratories are noted.  X-ray from yesterday is noted.



ASSESSMENT/PLAN:  A 77-year-old male with obesity, body mass index of 34

with hypertension, diabetes, chronic obstructive lung disease, prostate

cancer, renal insufficiency and admitted with systemic inflammatory

response syndrome and workup negative as far as microbiology with negative

blood cultures, negative urine cultures, negative stool.  The sputum

cultures did grow yeast.  The patient is currently on p.o. doxycycline.  We

will follow closely with you.





__________________________________________

Félix Graham MD



DD:  12/23/2017 12:09:29

DT:  12/23/2017 12:14:56

Job # [id number]

## 2017-12-23 NOTE — DS
HISTORY OF PRESENT ILLNESS:  The patient is 77-year-old, seen and examined,

doing well, eating and tolerating,  No nausea or vomiting.  No diarrhea.



PHYSICAL EXAMINATION:

VITAL SIGNS:  The patient is afebrile, pulse 86, respirations 20, blood

pressure 116/65.

LUNGS:  Bilateral soft crackles at basis.

HEART:  S1 and S2 audible.

ABDOMEN:  Soft and nontender.  No rebound.  No guarding.

NEUROLOGIC:  The patient is awake, alert, oriented, and communicative.



LABORATORY EXAM:  Blood sugar is 160.  Sodium 138, potassium 4.2, chloride

104, CO2 of 28, BUN 24, creatinine 1.2.  Blood sugar of 168.  Sputum has

yeast.  Stool for C. diff is negative.



ASSESSMENT:

1.  Failed outpatient treatment.

2.  Bilateral pneumonia.

3.  Chronic atrial fibrillation.

4.  Hypertension.

5.  Morbid obesity.

6.  Active smoker.



PLAN:  The patient is currently on antibiotic, nebulizer treatment.  We

will monitor blood sugar.  Continue on Eliquis.  The patient is accepted in

TCU.  He will be transferred to TCU to complete course of antibiotic.





__________________________________________

Audie Pathak MD





DD:  12/22/2017 18:06:33

DT:  12/23/2017 0:16:43

Job # [id number]

## 2017-12-23 NOTE — PN
DATE:  12/22/2017



SUBJECTIVE:  The patient seen in bed early this morning in room 565, bed 1.

No fevers, no chills.  No nausea.



PHYSICAL EXAMINATION:

VITAL SIGNS:  Temperature is 98 blood pressure is 130/70, respiratory rate

of 20, heart rate of 86.

HEENT:  Unremarkable.

NECK:  Supple.

LUNGS:  Have decreased breath sounds.

HEART:  Normal S1, S2.

ABDOMEN:  Soft, nontender, no rebound or guarding.



LABORATORY EXAMINATION:  Reveals a white count 11,500, hemoglobin of 9,

platelets of 292.  BUN of 24, creatinine of 1.2.  Urinalysis is noted. 

Microbiology reveals the sputum yeast.  Stool for C. diff; negative toxin

and negative antigen.  Blood cultures are negative.  Urine cultures are no

growth.



ASSESSMENT AND PLAN:  This is a 77-year-old male with past medical history

significant for prostate cancer, renal disease, diabetes, systemic

inflammatory response syndrome, negative cultures, negative Clostridium

difficile, and urine for Legionella antigen is pending.  The patient is on

p.o. doxycycline..  Chest x-ray shows slight small right pleural effusion,

no pneumothorax, the lungs patchy _____ right upper and middle lobe

infiltrate.  We will follow with you.





__________________________________________

Félix Graham MD





DD:  12/22/2017 20:09:09

DT:  12/22/2017 20:13:33

Job # [id number]

## 2017-12-24 RX ADMIN — PANTOPRAZOLE SODIUM SCH MG: 40 TABLET, DELAYED RELEASE ORAL at 05:27

## 2017-12-24 RX ADMIN — INSULIN HUMAN SCH UNITS: 100 INJECTION, SOLUTION PARENTERAL at 17:42

## 2017-12-24 RX ADMIN — POTASSIUM CHLORIDE SCH MEQ: 10 TABLET, FILM COATED, EXTENDED RELEASE ORAL at 17:41

## 2017-12-24 RX ADMIN — IPRATROPIUM BROMIDE AND ALBUTEROL SULFATE SCH ML: .5; 3 SOLUTION RESPIRATORY (INHALATION) at 07:47

## 2017-12-24 RX ADMIN — IPRATROPIUM BROMIDE AND ALBUTEROL SULFATE SCH ML: .5; 3 SOLUTION RESPIRATORY (INHALATION) at 03:05

## 2017-12-24 RX ADMIN — IPRATROPIUM BROMIDE AND ALBUTEROL SULFATE SCH ML: .5; 3 SOLUTION RESPIRATORY (INHALATION) at 14:02

## 2017-12-24 RX ADMIN — INSULIN HUMAN SCH: 100 INJECTION, SOLUTION PARENTERAL at 23:09

## 2017-12-24 RX ADMIN — INSULIN HUMAN SCH: 100 INJECTION, SOLUTION PARENTERAL at 06:47

## 2017-12-24 RX ADMIN — POTASSIUM CHLORIDE SCH MEQ: 10 TABLET, FILM COATED, EXTENDED RELEASE ORAL at 10:53

## 2017-12-24 RX ADMIN — IPRATROPIUM BROMIDE AND ALBUTEROL SULFATE SCH ML: .5; 3 SOLUTION RESPIRATORY (INHALATION) at 22:40

## 2017-12-24 RX ADMIN — INSULIN HUMAN SCH UNITS: 100 INJECTION, SOLUTION PARENTERAL at 11:30

## 2017-12-24 NOTE — HP
HISTORY OF PRESENT ILLNESS:  Patient is 77 years old, came to the emergency

room because of high grade fever, cough, and intractable diarrhea.  The

patient states that he has been sick for the last 2 to 3 weeks.  He went to

see Dr. Solis, but was seen by a nurse practitioner, was given Zithromax

with no significant relief.  He went second time, he was given another

course of Zithromax that day, he developed diarrhea, felt very weak and

tired, so prior to coming to the hospital, he was seen by Dr. Solis who

felt that the patient needed to be admitted because of failed outpatient

treatment.



PAST MEDICAL HISTORY:  Significant for hypertension, hyperlipidemia,

chronic AFib, morbid obesity, and COPD.



ALLERGIES:  HE IS ALLERGIC TO ATORVASTATIN, PENICILLIN, PROCAINE,

PROPOXYPHENE, DARVON, AND NOVOCAIN.



MEDICATIONS:  At home:  He is on amlodipine, valsartan, Flomax, Viagra,

Prandin, potassium, Protonix, nisoldipine, Tradjenta, Avapro, and insulin.



FAMILY HISTORY:  He is , lives with his wife.



SOCIAL HISTORY:  Denies alcohol abuse, socially drinks, but does not smoke.



PHYSICAL EXAMINATION:

GENERAL:  He is awake, alert, oriented, communicative.

VITAL SIGNS:  He is afebrile, pulse 90, respirations 20, blood pressure

186/82.

LUNGS:  Bilateral good airflow.  No rhonchi or crackle.

HEART:  S1, S2 audible.

ABDOMEN:  Soft, nontender.  Obese.  No hepatosplenomegaly.

NEUROLOGICAL:  Patient is awake and alert.  Able to communicate. 

Ambulatory.



LABORATORY EXAMINATION:  Blood sugar is 255.



ASSESSMENT AND PLAN:

1.  Morbid obesity.

2.  Resolving pneumonia.

3.  Hypertension.

4.  Non-insulin dependent diabetes.

5.  Active smoker.

6.  Chronic obstructive pulmonary disease.



PLAN:  Currently, the patient is on glimepiride, aspirin.  I will add

Catapres 0.2 t.i.d., carvedilol and losartan and get nebulizer treatment. 

He is on Eliquis, we will continue that.  We will monitor his blood

pressure closely.  We will follow up this patient in a.m.





__________________________________________

Audie Pathak MD



DD:  12/23/2017 15:18:28

DT:  12/23/2017 21:34:07

Job # [id number]

## 2017-12-24 NOTE — PN
DATE:  12/24/2017



SUBJECTIVE:  The patient is in bed in no acute distress, nontoxic.



PHYSICAL EXAMINATION:

VITAL SIGNS:  Temperature is 98, blood pressure is 150/70, and respiratory

rate of 18.

HEENT:  Unremarkable.

NECK:  Supple.

LUNGS:  Have decreased breath sounds.

HEART:  Normal S1 and S2.

ABDOMEN:  Soft and nontender.



LABORATORY EXAMINATION:  Reviewed.



ASSESSMENT AND PLAN:  This is a 77-year-old male with obesity, body mass

index of 34, hypertension, diabetes, chronic obstructive lung disease,

prostate cancer, renal insufficiency admitted with systemic inflammatory

response syndrome and negative blood cultures, negative urine cultures, and

on p.o. doxycycline.  The patient did have a procalcitonin of 0.15 on

12/18/2017.  We will follow with you.





__________________________________________

Félix Graham MD





DD:  12/24/2017 12:29:22

DT:  12/24/2017 12:30:38

Job # [id number]

## 2017-12-25 RX ADMIN — PANTOPRAZOLE SODIUM SCH MG: 40 TABLET, DELAYED RELEASE ORAL at 06:34

## 2017-12-25 RX ADMIN — INSULIN HUMAN SCH UNITS: 100 INJECTION, SOLUTION PARENTERAL at 18:07

## 2017-12-25 RX ADMIN — INSULIN HUMAN SCH: 100 INJECTION, SOLUTION PARENTERAL at 06:44

## 2017-12-25 RX ADMIN — IPRATROPIUM BROMIDE AND ALBUTEROL SULFATE SCH ML: .5; 3 SOLUTION RESPIRATORY (INHALATION) at 03:39

## 2017-12-25 RX ADMIN — IPRATROPIUM BROMIDE AND ALBUTEROL SULFATE SCH ML: .5; 3 SOLUTION RESPIRATORY (INHALATION) at 07:35

## 2017-12-25 RX ADMIN — POTASSIUM CHLORIDE SCH MEQ: 10 TABLET, FILM COATED, EXTENDED RELEASE ORAL at 08:53

## 2017-12-25 RX ADMIN — IPRATROPIUM BROMIDE AND ALBUTEROL SULFATE SCH ML: .5; 3 SOLUTION RESPIRATORY (INHALATION) at 21:44

## 2017-12-25 RX ADMIN — POTASSIUM CHLORIDE SCH MEQ: 10 TABLET, FILM COATED, EXTENDED RELEASE ORAL at 18:00

## 2017-12-25 RX ADMIN — INSULIN HUMAN SCH: 100 INJECTION, SOLUTION PARENTERAL at 21:31

## 2017-12-25 RX ADMIN — INSULIN HUMAN SCH UNITS: 100 INJECTION, SOLUTION PARENTERAL at 12:30

## 2017-12-25 RX ADMIN — IPRATROPIUM BROMIDE AND ALBUTEROL SULFATE SCH ML: .5; 3 SOLUTION RESPIRATORY (INHALATION) at 13:49

## 2017-12-25 NOTE — PN
DATE:  12/25/2017



SUBJECTIVE:  The patient in bed in no acute distress, nontoxic.  No fevers.



PHYSICAL EXAMINATION:

VITAL SIGNS:  Temperature is 98, blood pressure is 140/70, respiratory rate

is 18.

HEENT:  Unremarkable.

NECK:  Supple.

LUNGS:  Decreased breath sounds.

HEART:  Normal S1 and S2.

ABDOMEN:  Soft.



LABORATORY DATA:  Reviewed and microbiology is reviewed.



ASSESSMENT AND PLAN:  The patient is a 77-year-old male who is seen early

this morning.  The patient's wife was at the bedside who states that the

patient is doing well and with a history of obesity, BMI of 34,

hypertension, diabetes, chronic obstructive lung disease, prostate cancer,

renal insufficiency, admitted with systemic inflammatory response syndrome,

negative blood cultures, negative urine cultures, on p.o. doxycycline. 

Today is day #8 of antibiotics, we will discontinue the doxycycline after

today's last dose.





__________________________________________

Félix Graham MD





DD:  12/25/2017 10:09:39

DT:  12/25/2017 10:39:43

Job # [id number]

## 2017-12-25 NOTE — PN
DATE:



SUBJECTIVE:  The patient is a 77-year-old seen and examined, lying in bed,

seems to be comfortable.  Has scanty cough.



PHYSICAL EXAMINATION

VITAL SIGNS:  He is afebrile, pulse 70, respirations 18 and blood pressure

152/73.

LUNGS:  Bilateral fair airflow.  No rhonchi or crackle.

HEART:  S1 and S2 audible.

ABDOMEN:  Soft, obese and nontender.  No rebound.  No guarding.

NEUROLOGIC:  He is awake, alert and oriented.  Able to communicate.



LABORATORY DATA:  Blood sugar is 202.



ASSESSMENT:

1.  Community acquired pneumonia, failed outpatient treatment.

2.  Chronic atrial fibrillation.

3.  Hypertension.

4.  Hyperlipidemia.

5.  Morbid obesity.

6.  Non-insulin dependent diabetes.



PLAN:  We will continue the patient on doxycycline.  He is on Eliquis. 

Encourage ambulation.  We will continue him on glimepiride.  Since his

kidney function is okay, I will start him on metformin.





__________________________________________

Audie Pathak MD





DD:  12/25/2017 15:17:47

DT:  12/25/2017 18:16:00

Job # [id number]

## 2017-12-26 LAB
ALBUMIN SERPL-MCNC: 2.9 G/DL (ref 3–4.8)
ALBUMIN/GLOB SERPL: 1 {RATIO} (ref 1.1–1.8)
ALT SERPL-CCNC: 31 U/L (ref 7–56)
AST SERPL-CCNC: 17 U/L (ref 17–59)
BASOPHILS # BLD AUTO: 0.02 K/MM3 (ref 0–2)
BASOPHILS NFR BLD: 0.2 % (ref 0–3)
BUN SERPL-MCNC: 35 MG/DL (ref 7–21)
CALCIUM SERPL-MCNC: 8.7 MG/DL (ref 8.4–10.5)
EOSINOPHIL # BLD: 0.4 10*3/UL (ref 0–0.7)
EOSINOPHIL NFR BLD: 3.9 % (ref 1.5–5)
ERYTHROCYTE [DISTWIDTH] IN BLOOD BY AUTOMATED COUNT: 14 % (ref 11.5–14.5)
GFR NON-AFRICAN AMERICAN: 49
GRANULOCYTES # BLD: 7.21 10*3/UL (ref 1.4–6.5)
GRANULOCYTES NFR BLD: 77.8 % (ref 50–68)
HGB BLD-MCNC: 9.7 G/DL (ref 14–18)
LYMPHOCYTES # BLD: 0.8 10*3/UL (ref 1.2–3.4)
LYMPHOCYTES NFR BLD AUTO: 8.9 % (ref 22–35)
MCH RBC QN AUTO: 28 PG (ref 25–35)
MCHC RBC AUTO-ENTMCNC: 33.1 G/DL (ref 31–37)
MCV RBC AUTO: 84.4 FL (ref 80–105)
MONOCYTES # BLD AUTO: 0.9 10*3/UL (ref 0.1–0.6)
MONOCYTES NFR BLD: 9.2 % (ref 1–6)
PLATELET # BLD: 257 10^3/UL (ref 120–450)
PMV BLD AUTO: 10.8 FL (ref 7–11)
RBC # BLD AUTO: 3.47 10^6/UL (ref 3.5–6.1)
WBC # BLD AUTO: 9.3 10^3/UL (ref 4.5–11)

## 2017-12-26 RX ADMIN — INSULIN HUMAN SCH: 100 INJECTION, SOLUTION PARENTERAL at 06:34

## 2017-12-26 RX ADMIN — INSULIN HUMAN SCH UNITS: 100 INJECTION, SOLUTION PARENTERAL at 17:19

## 2017-12-26 RX ADMIN — PANTOPRAZOLE SODIUM SCH MG: 40 TABLET, DELAYED RELEASE ORAL at 06:08

## 2017-12-26 RX ADMIN — IPRATROPIUM BROMIDE AND ALBUTEROL SULFATE SCH ML: .5; 3 SOLUTION RESPIRATORY (INHALATION) at 13:15

## 2017-12-26 RX ADMIN — IPRATROPIUM BROMIDE AND ALBUTEROL SULFATE SCH ML: .5; 3 SOLUTION RESPIRATORY (INHALATION) at 07:22

## 2017-12-26 RX ADMIN — IPRATROPIUM BROMIDE AND ALBUTEROL SULFATE SCH ML: .5; 3 SOLUTION RESPIRATORY (INHALATION) at 20:18

## 2017-12-26 RX ADMIN — INSULIN HUMAN SCH UNITS: 100 INJECTION, SOLUTION PARENTERAL at 12:35

## 2017-12-26 RX ADMIN — POTASSIUM CHLORIDE SCH MEQ: 10 TABLET, FILM COATED, EXTENDED RELEASE ORAL at 17:16

## 2017-12-26 RX ADMIN — INSULIN HUMAN SCH: 100 INJECTION, SOLUTION PARENTERAL at 21:24

## 2017-12-26 RX ADMIN — IPRATROPIUM BROMIDE AND ALBUTEROL SULFATE SCH ML: .5; 3 SOLUTION RESPIRATORY (INHALATION) at 03:40

## 2017-12-26 RX ADMIN — POTASSIUM CHLORIDE SCH MEQ: 10 TABLET, FILM COATED, EXTENDED RELEASE ORAL at 09:11

## 2017-12-26 NOTE — PN
DATE:  12/24/2017



SUBJECTIVE:  The patient is a 77-year-old, seen and examined, sitting in

chair, seems to be comfortable.  No nausea, vomiting.  No diarrhea.  Cough

and shortness of breath is better.



PHYSICAL EXAMINATION

VITAL SIGNS:  The patient is afebrile, pulse 86, respirations 18, blood

pressure 157/70.

LUNGS:  Bilateral fair airflow.  No rhonchi or crackles.

HEART:  S1 and S2 audible.  No murmur.

ABDOMEN:  Soft, obese, nontender.  No rebound.  No guarding.

NEUROLOGICAL:  The patient is awake, alert, oriented, able to communicate.



LABORATORY DATA:  Blood sugar is 180, his recent sputum, stool for C. diff 

is negative.



ASSESSMENT:

1.  Status post diarrhea secondary to antibiotics.

2.  Community acquired pneumonia.

3.  Failed outpatient treatment.

4.  Chronic atrial fibrillation.

5.  Renal insufficiency.

6.  Hypertension.

7.  Non-insulin dependent diabetes.



PLAN:  Currently, the patient is on clonidine 0.2 t.i.d., aspirin 81 daily,

glimepiride 4 mg twice a day, Valsartan 320 daily, he is doxycycline.  He

is on Eliquis 5 mg b.i.d. and monitor his blood sugar.  He is on Lasix.  We

will follow up the patient in a.m.





__________________________________________

Diego Smyth MD





DD:  12/24/2017 15:32:51

DT:  12/24/2017 19:04:02

Job # [id number]

## 2017-12-26 NOTE — PN
SUBJECTIVE:  Patient is a 77-year-old, seen and examined, lying in bed,

seems to be comfortable.  No nausea or vomiting, no diarrhea.  Eating and

tolerating.



PHYSICAL EXAMINATION

VITAL SIGNS:  He is afebrile.  Pulse 76, respirations 15, blood pressure

135/65.

LUNGS:  Bilateral good airflow.  No rhonchi or crackles.

HEART:  S1 and S2 audible.

ABDOMEN:  Soft, nontender.  No rebound.  Obese.  No hepatosplenomegaly.

EXTREMITIES:  Bilateral legs +1 edema.



LABORATORY EXAMINATION:  WBC 9.3, hemoglobin 9.7, hematocrit 29.6, platelet

257.  Chemistries: Sodium 135, potassium 4.5, chloride 102, CO2 of 25, BUN

35, creatinine 1.4, blood sugar of 153.  Stool for hemoccult is positive.



ASSESSMENT:

1.  Failed outpatient treatment.

2.  Community acquired pneumonia.

3.  Chronic atrial fibrillation.

4.  Hypertension.

5.  Hyperlipidemia.

6.  Stool positive for hemoccult.



PLAN:  We will start the patient on Eliquis and get GI evaluation by Dr. Ramirez.





__________________________________________

Audie Pathak MD





DD:  12/26/2017 19:05:33

DT:  12/26/2017 19:27:01

Job # [id number]

## 2017-12-26 NOTE — PN
DATE:  12/26/2017



SUBJECTIVE:  The patient is in bed, in no acute distress, nontoxic.



PHYSICAL EXAMINATION:

VITAL SIGNS:  Temperature is 97, blood pressure is 170/80, respiratory rate

of 18, heart rate of 67.

HEENT:  Unremarkable.

NECK:  Supple.

LUNGS:  Have decreased breath sounds.

HEART:  Normal S1, S2.

ABDOMEN:  Soft, nontender.



LABORATORY DATA:  Reveals a white count of 9.3, hemoglobin of 9, platelets

of 257, BUN of 35, creatinine of 1.4 and a stool for occult blood is

positive.  Microbiology is noted.



ASSESSMENT AND PLAN:  A 77-year-old male, the patient is seen early this

morning, in no acute distress, nontoxic with history of obesity, BMI of 34,

hypertension, diabetes, chronic obstructive lung disease, prostate cancer,

renal insufficiency admitted with systemic inflammatory response syndrome

with negative blood cultures, negative urine cultures, received 8 days of

doxycycline.  Currently, the patient is off of antibiotics.  We will

discontinue doxycycline, today is day #9.  The patient did have a

procalcitonin 0.15.  We will follow the patient off of antibiotics.  Dr. Pathak's note is reviewed from yesterday.







__________________________________________

Félix Graham MD







DD:  12/26/2017 11:10:20

DT:  12/26/2017 11:13:43

Job # [id number]

## 2017-12-27 RX ADMIN — IPRATROPIUM BROMIDE AND ALBUTEROL SULFATE SCH ML: .5; 3 SOLUTION RESPIRATORY (INHALATION) at 20:45

## 2017-12-27 RX ADMIN — INSULIN HUMAN SCH UNITS: 100 INJECTION, SOLUTION PARENTERAL at 06:52

## 2017-12-27 RX ADMIN — INSULIN HUMAN SCH: 100 INJECTION, SOLUTION PARENTERAL at 11:30

## 2017-12-27 RX ADMIN — PANTOPRAZOLE SODIUM SCH MG: 40 TABLET, DELAYED RELEASE ORAL at 06:21

## 2017-12-27 RX ADMIN — IPRATROPIUM BROMIDE AND ALBUTEROL SULFATE SCH: .5; 3 SOLUTION RESPIRATORY (INHALATION) at 01:33

## 2017-12-27 RX ADMIN — INSULIN HUMAN SCH: 100 INJECTION, SOLUTION PARENTERAL at 21:33

## 2017-12-27 RX ADMIN — INSULIN HUMAN SCH: 100 INJECTION, SOLUTION PARENTERAL at 18:00

## 2017-12-27 RX ADMIN — POTASSIUM CHLORIDE SCH MEQ: 10 TABLET, FILM COATED, EXTENDED RELEASE ORAL at 18:00

## 2017-12-27 RX ADMIN — IPRATROPIUM BROMIDE AND ALBUTEROL SULFATE SCH ML: .5; 3 SOLUTION RESPIRATORY (INHALATION) at 07:38

## 2017-12-27 RX ADMIN — IPRATROPIUM BROMIDE AND ALBUTEROL SULFATE SCH ML: .5; 3 SOLUTION RESPIRATORY (INHALATION) at 13:15

## 2017-12-27 RX ADMIN — POTASSIUM CHLORIDE SCH MEQ: 10 TABLET, FILM COATED, EXTENDED RELEASE ORAL at 08:40

## 2017-12-27 NOTE — PN
DATE:



SUBJECTIVE:  The patient is a 77-year-old, seen and examined, sitting in

chair, seems to be comfortable, scanty cough.



PHYSICAL EXAMINATION:

VITAL SIGNS:  The patient is afebrile, pulse 77, respirations 18 and blood

pressure 121/69.

LUNGS:  Bilateral good airflow.  No rhonchi or crackle.

HEART:  S1 and S2 audible.

ABDOMEN:  Soft, obese and nontender.  No rebound.  No guarding.

NEUROLOGIC:  The patient is awake and alert, communicative.



LABORATORY EXAMINATION:  Blood sugar is 132.  Stool for Hemoccult is

positive.



ASSESSMENT AND PLAN:

1.  Paroxysmal atrial fibrillation, new onset.

2.  Community-acquired pneumonia.

3.  Hypertension.

4.  Positive stool for Hemoccult.

5.  Non-insulin-dependent diabetes.



PLAN:  I will order for CBC, CMP in a.m.  I will order for EKG.  I will

request cardiology evaluation to determine if patient needs to be on

Eliquis, he states he was not on it before and this paroxysmal atrial

fibrillation could be transient.  I will request Dr. Acosta _____ to

evaluate the patient for that reason.





__________________________________________

Audie Pathak MD





DD:  12/27/2017 20:09:56

DT:  12/27/2017 21:22:25

Job # [id number]

## 2017-12-27 NOTE — PN
DATE:  12/27/2017



SUBJECTIVE:  The patient is in bed, in no acute distress, nontoxic.



PHYSICAL EXAMINATION:

VITAL SIGNS:  Temperature is 98, blood pressure is 120/60, respiratory rate

of 17, heart rate of 77.

HEENT:  Unremarkable.

NECK:  Supple.

LUNGS:  Have decreased breath sounds.

HEART:  Normal S1, S2.

ABDOMEN:  Soft, nontender.



LABORATORY DATA:  Reveals a white count of 9.3, hemoglobin of 9, platelets

of 257.  Chemistries are noted.  Creatinine is 1.4 and a stool for occult

blood is positive.



ASSESSMENT AND PLAN:  This is a 77-year-old male, who is seen early this

morning, his wife at the bedside, nontoxic with history of obesity, BMI of

34, hypertension, diabetes, chronic obstructive lung disease, prostate

cancer, renal insufficiency admitted with systemic inflammatory response

syndrome with negative blood cultures, negative urine cultures, received 8

days of doxycycline.  Currently, off of antibiotics.



We will follow with you.





__________________________________________

Félix Graham MD



DD:  12/27/2017 19:35:53

DT:  12/27/2017 20:32:18

Job # [id number]

## 2017-12-28 VITALS — RESPIRATION RATE: 20 BRPM

## 2017-12-28 RX ADMIN — POTASSIUM CHLORIDE SCH MEQ: 10 TABLET, FILM COATED, EXTENDED RELEASE ORAL at 17:42

## 2017-12-28 RX ADMIN — INSULIN HUMAN SCH: 100 INJECTION, SOLUTION PARENTERAL at 12:00

## 2017-12-28 RX ADMIN — IPRATROPIUM BROMIDE AND ALBUTEROL SULFATE SCH ML: .5; 3 SOLUTION RESPIRATORY (INHALATION) at 08:01

## 2017-12-28 RX ADMIN — POTASSIUM CHLORIDE SCH MEQ: 10 TABLET, FILM COATED, EXTENDED RELEASE ORAL at 09:10

## 2017-12-28 RX ADMIN — INSULIN HUMAN SCH: 100 INJECTION, SOLUTION PARENTERAL at 17:00

## 2017-12-28 RX ADMIN — IPRATROPIUM BROMIDE AND ALBUTEROL SULFATE SCH ML: .5; 3 SOLUTION RESPIRATORY (INHALATION) at 21:04

## 2017-12-28 RX ADMIN — INSULIN HUMAN SCH: 100 INJECTION, SOLUTION PARENTERAL at 06:31

## 2017-12-28 RX ADMIN — IPRATROPIUM BROMIDE AND ALBUTEROL SULFATE SCH ML: .5; 3 SOLUTION RESPIRATORY (INHALATION) at 13:06

## 2017-12-28 RX ADMIN — PANTOPRAZOLE SODIUM SCH MG: 40 TABLET, DELAYED RELEASE ORAL at 05:27

## 2017-12-28 RX ADMIN — INSULIN HUMAN SCH: 100 INJECTION, SOLUTION PARENTERAL at 22:37

## 2017-12-28 RX ADMIN — IPRATROPIUM BROMIDE AND ALBUTEROL SULFATE SCH: .5; 3 SOLUTION RESPIRATORY (INHALATION) at 02:01

## 2017-12-28 NOTE — CON
DATE:  12/27/2017



This patient was seen and evaluated earlier today.



HISTORY OF PRESENT ILLNESS:  This is a 77-year-old patient who was admitted

to the hospital for failed outpatient therapy for respiratory infection. 

The patient is with high fever and cough.  The patient was initially given

antibiotic, Zithromax, had diarrhea and another course of antibiotic was

given.  The patient still had diarrhea.  Since admission, he did not have

any diarrhea.  The patient was found to have atrial fibrillation, started

on an Eliquis.  Noticed to have a slow drop in blood count.  The patient

was concerned because of that drop in blood count.  The patient denies

having any melena or bright red blood per rectum.  The patient mentioned to

me he has been followed in the [facility] 4 years

ago.  He had an endoscopy and colonoscopy done 4 years ago, and he was told

to have some abnormalities in the esophagus, had some biopsies.  A colonoscopy

was negative.  The patient was advised to follow up but he did not do that.

The patient had a history of questionable prostate cancer, was followed,

but he was told that it was not a cancer.  The patient had a right forearm

melanoma removed and also axillary clearance done few years ago.



PAST MEDICAL HISTORY:  Other past medical history is significant for

hypertension, dyslipidemia, morbid obesity, COPD, diabetes mellitus.



ALLERGIES:  HE IS ALLERGIC TO MULTIPLE MEDICATIONS INCLUDING ATORVASTATIN,

PENICILLIN, PROCAINE, PROPOXYPHENE, DARVON, AND NOVOCAIN.



SOCIAL HISTORY:  He denies alcohol, denies smoking.



FAMILY HISTORY:  Noncontributory.



REVIEW OF SYSTEMS:  Positive as above.  Other systems reviewed.



PHYSICAL EXAMINATION:

GENERAL:  The patient is lying on the bed, not in acute distress.

VITAL SIGNS:  Temperature is afebrile, pulse is 77, blood pressure 121/68,

O2 saturation 94%.

HEENT:  Atraumatic, anicteric.

NECK:  Supple.

HEART:  S1, S2 heard.

LUNGS:  Bilateral air entry present.

EXTREMITIES:  1+ edema present.

NEUROLOGICAL:  Alert, oriented, moves all the extremities.



LABORATORY DATA:  Hemoglobin 9.7, hematocrit 29.3, WBC 9.3, platelets 257. 

BUN 35, creatinine 1.4.



IMPRESSION:  This is a 77-year-old patient, admitted with systemic

inflammatory response syndrome, presently on doxycycline, ?history of

prostate cancer (patient denies cancer of the prostate, he states it is only 
enlarged prostate,,)  chronic kidney disease. The Hb was 

9.7, he has had chronic anemia, but 3 years ago, his hemoglobin was 12.8. slow 
drop in Hb

The concern is his stool for occult blood positive.



RECOMMENDATION:  We would recommend to,

1.  Continue the proton pump inhibitors.

2.  Close followup of the hemoglobin and hematocrit.

3.  We will request for iron studies.

4.  The patient would benefit from the endoscopy and colonoscopy.  As the

patient is on Eliquis, we will discuss with the Cardiologist and also Dr. Pathak.  At the movement, the hemoglobin appears to be stable.  We will

have close followup.  I had a detailed discussion with the patient.  The

patient would clearly benefit from the upper gastrointestinal workup,

especially,in view of the above

history of workup done in [facility] in the past.



Thank you very much for allowing us to participate in the care of the

patient.





__________________________________________

Elana Ramirez MD



DD:  12/27/2017 18:29:17

DT:  12/28/2017 0:54:22

Job # [id number]





[email or web address]

## 2017-12-28 NOTE — PN
DATE:



SUBJECTIVE:  The patient is a 77-year-old seen and examined, sitting in

chair, seems to be comfortable, complain of bilateral leg swelling and

itchy rash on both shins.  Cough seems to be improving.  Appetite seems to

be okay.  No nausea or vomiting.



PHYSICAL EXAMINATION

VITAL SIGNS:  He is afebrile, pulse 75, respirations 20 and blood pressure

138/74.

LUNGS:  Bilateral fair airflow.  Occasional expiratory rhonchi, left lower

back.

HEART:  S1 and S2 audible.

ABDOMEN:  Soft, obese and nontender.  No rebound.  No guarding.

EXTREMITIES:  Bilateral leg +2 edema.

NEUROLOGIC:  He is awake, alert, oriented and able to ambulate.



LABORATORY DATA:  Blood sugar is 129.  Stool Hemoccult is positive.  Had

EKG done this morning shows normal sinus rhythm, left axis deviation.



ASSESSMENT AND PLAN:

1.  Community acquired pneumonia, status post failed outpatient treatment.

2.  Non-insulin dependent diabetes.

3.  Hypertension.

4.  Brief episode of atrial fibrillation.  The patient was started on

Eliquis, but he was started to have hemoptysis, so it is being

discontinued.  We will monitor his blood sugar.  Followup electrolyte and

encourage ambulation and continue Physical Therapy.





__________________________________________

Audie Pathak MD



DD:  12/28/2017 19:11:36

DT:  12/28/2017 19:42:44

Job # [id number]

## 2017-12-28 NOTE — CARD
--------------- APPROVED REPORT --------------





EKG Measurement

Heart [id number]

DE 134P-8

[id number]

[id number]

[id number]



<Conclusion>

Normal sinus rhythm

Left axis deviation

Abnormal ECG

## 2017-12-28 NOTE — PN
DATE:  12/28/2017



SUBJECTIVE:  The patient is in bed in no acute distress, nontoxic.



PHYSICAL EXAMINATION:

VITAL SIGNS:  Temperature is 98, blood pressure is 150/70, respiratory rate

of 18, and heart rate of 77.

HEENT:  Unremarkable.

NECK:  Supple.

LUNGS:  Have decreased breath sounds.

HEART:  Normal S1 and S2.

ABDOMEN:  Soft and nontender.



LABORATORY EXAMINATION:  Reveals a white count of 9.3, hemoglobin of 9, and

platelets of 257.  Chemistries are noted and the patient has a BUN of 35

and creatinine of 1.4.  Microbiology is noted.



ASSESSMENT AND PLAN:  He is a 77-year-old male was seen early this morning,

nontoxic with a history of obesity, body mass index of 34, hypertension,

diabetic, chronic obstructive lung disease, prostate cancer, renal

insufficiency admitted with systemic inflammatory response syndrome,

negative cultures and received 8 days of doxycycline, currently now off of

antibiotics and afebrile.  The patient did have a normal procalcitonin and

acute care.  We will follow with you.  The patient is at risk for

developing nosocomial infection.





__________________________________________

Félix Graham MD





DD:  12/28/2017 14:25:10

DT:  12/28/2017 14:26:04

Job # [id number]

## 2017-12-29 LAB
ALBUMIN SERPL-MCNC: 3 G/DL (ref 3–4.8)
ALBUMIN/GLOB SERPL: 1 {RATIO} (ref 1.1–1.8)
ALT SERPL-CCNC: 28 U/L (ref 7–56)
AST SERPL-CCNC: 18 U/L (ref 17–59)
BASOPHILS # BLD AUTO: 0.01 K/MM3 (ref 0–2)
BASOPHILS NFR BLD: 0.1 % (ref 0–3)
BUN SERPL-MCNC: 31 MG/DL (ref 7–21)
CALCIUM SERPL-MCNC: 8.9 MG/DL (ref 8.4–10.5)
EOSINOPHIL # BLD: 0.4 10*3/UL (ref 0–0.7)
EOSINOPHIL NFR BLD: 5.2 % (ref 1.5–5)
ERYTHROCYTE [DISTWIDTH] IN BLOOD BY AUTOMATED COUNT: 14.3 % (ref 11.5–14.5)
GFR NON-AFRICAN AMERICAN: 49
GRANULOCYTES # BLD: 4.8 10*3/UL (ref 1.4–6.5)
GRANULOCYTES NFR BLD: 70.8 % (ref 50–68)
HGB BLD-MCNC: 9.2 G/DL (ref 14–18)
LYMPHOCYTES # BLD: 1 10*3/UL (ref 1.2–3.4)
LYMPHOCYTES NFR BLD AUTO: 14.9 % (ref 22–35)
MCH RBC QN AUTO: 27.5 PG (ref 25–35)
MCHC RBC AUTO-ENTMCNC: 32.5 G/DL (ref 31–37)
MCV RBC AUTO: 84.5 FL (ref 80–105)
MONOCYTES # BLD AUTO: 0.6 10*3/UL (ref 0.1–0.6)
MONOCYTES NFR BLD: 9 % (ref 1–6)
PLATELET # BLD: 266 10^3/UL (ref 120–450)
PMV BLD AUTO: 11 FL (ref 7–11)
RBC # BLD AUTO: 3.35 10^6/UL (ref 3.5–6.1)
WBC # BLD AUTO: 6.8 10^3/UL (ref 4.5–11)

## 2017-12-29 RX ADMIN — IPRATROPIUM BROMIDE AND ALBUTEROL SULFATE SCH ML: .5; 3 SOLUTION RESPIRATORY (INHALATION) at 19:45

## 2017-12-29 RX ADMIN — IPRATROPIUM BROMIDE AND ALBUTEROL SULFATE SCH ML: .5; 3 SOLUTION RESPIRATORY (INHALATION) at 07:40

## 2017-12-29 RX ADMIN — INSULIN HUMAN SCH: 100 INJECTION, SOLUTION PARENTERAL at 21:45

## 2017-12-29 RX ADMIN — IPRATROPIUM BROMIDE AND ALBUTEROL SULFATE SCH ML: .5; 3 SOLUTION RESPIRATORY (INHALATION) at 01:23

## 2017-12-29 RX ADMIN — IPRATROPIUM BROMIDE AND ALBUTEROL SULFATE SCH ML: .5; 3 SOLUTION RESPIRATORY (INHALATION) at 13:38

## 2017-12-29 RX ADMIN — POTASSIUM CHLORIDE SCH MEQ: 10 TABLET, FILM COATED, EXTENDED RELEASE ORAL at 08:41

## 2017-12-29 RX ADMIN — PANTOPRAZOLE SODIUM SCH MG: 40 TABLET, DELAYED RELEASE ORAL at 05:38

## 2017-12-29 RX ADMIN — INSULIN HUMAN SCH: 100 INJECTION, SOLUTION PARENTERAL at 16:48

## 2017-12-29 RX ADMIN — INSULIN HUMAN SCH: 100 INJECTION, SOLUTION PARENTERAL at 14:18

## 2017-12-29 RX ADMIN — INSULIN HUMAN SCH: 100 INJECTION, SOLUTION PARENTERAL at 06:41

## 2017-12-29 RX ADMIN — POTASSIUM CHLORIDE SCH MEQ: 10 TABLET, FILM COATED, EXTENDED RELEASE ORAL at 18:48

## 2017-12-29 NOTE — PN
DATE:  12/29/2017



SUBJECTIVE:  The patient is in bed, in no acute distress, nontoxic.



PHYSICAL EXAMINATION:

VITAL SIGNS:  Temperature is 98, blood pressure is 130/70, respiratory rate

20, heart rate of 75.

HEENT:  Unremarkable.

NECK:  Supple.

LUNGS:  Decreased breath sounds.

HEART:  Normal S1 and S2.

ABDOMEN:  Soft.



LABORATORY DATA:  Laboratory examination reveals a white count of 6.8,

hemoglobin of 9, platelets of 256.  Chemistries reveals a BUN of 31 and

creatinine of 1.4.  Microbiology is noted.



ASSESSMENT AND PLAN:  This is a 77-year-old male who is seen early this

morning, nontoxic, with a history of obesity, body mass index of 34,

hypertension, diabetes, chronic obstructive lung disease, prostate cancer,

renal insufficiency, admitted with systemic inflammatory response syndrome,

negative cultures and received 8 days of doxycycline, with a normal

procalcitonin.  Patient feels better, currently off of antibiotics.  Review

of the orders confirms the patient to be off of antibiotics.  Dr. Pathak's

note is reviewed from yesterday.  Patient is getting physical therapy at

this time.





__________________________________________

Félix Graham MD



DD:  12/29/2017 9:53:10

DT:  12/29/2017 10:39:36

Job # [id number]

## 2017-12-29 NOTE — PN
DATE:



SUBJECTIVE:  The patient is a 77-year-old, seen and examined, complain of

bilateral leg swelling, redness, and itchiness.  Denies any nausea or

vomiting.  I had a discussion with Dr. Ramirez.  The patient has Hemoccult

positive.  He had scope done _____ 5 years ago _____ history of Martinez's

esophagus and plan is to do endoscopy and colonoscopy as an outpatient.



PHYSICAL EXAMINATION:

GENERAL:  Today; he is awake, alert, oriented, and communicative.

VITAL SIGNS:  He is afebrile, pulse 73, respirations 20, and blood pressure

145/69.

LUNGS:  Bilateral fair airflow.  No rhonchi or crackles.

HEART:  S1 and S2 audible.

ABDOMEN:  Soft, obese, and nontender.  No rebound.  No guarding.

NEUROLOGIC:  The patient is awake, alert, oriented, and communicative.



LABORATORY DATA:  WBC 6.8, hemoglobin 9.2, hematocrit 29.3, and platelets

of 266.  Chemistries; sodium 136, potassium 4.1, chloride 101, CO2 of 27,

BUN 31, creatinine 1.4, and blood sugar of 150.



ASSESSMENT:

1.  Community-acquired pneumonia, failed outpatient treatment.

2.  Brief episode of atrial fibrillation during sepsis.

3.  Diarrhea resolved secondary to antibiotics.

4.  Morbid obesity.

5.  Stool Hemoccult positive for gastrointestinal bleed.



PLAN:  Discussed with Dr. Beth.  We will discontinue Eliquis since the

recent EKG has no episode of AFib.  I also spoke to Dr. Ramirez.  Plan is

to do endoscopy and colonoscopy as an outpatient after 4 weeks after all

these respiratory symptoms subside.  The patient understand that.  I will

discontinue Eliquis and discharge plan is for tomorrow morning.





__________________________________________

Audie Pathak MD





DD:  12/29/2017 18:30:58

DT:  12/29/2017 19:14:07

Job # [id number]

## 2017-12-29 NOTE — PN
DATE:  12/29/2017



SUBJECTIVE:  This patient was seen and elevated earlier today.  The patient

is comfortable.  No further episodes of bleeding.  No abdominal pain.



PHYSICAL EXAMINATION:

VITAL SIGNS:  Temperature is 98, pulse is 77, and blood pressure is 154/78.

HEENT:  Atraumatic and anicteric.

NECK:  Supple.

HEART:  S1 and S2 heard.

LUNGS:  Bilateral air entry present.

ABDOMEN:  Soft.  There is an umbilical hernia reducible at present.

EXTREMITIES:  Mild edema at present.

NEUROLOGIC:  Alert, oriented, and moves all the extremities.



LABORATORY DATA:  Hemoglobin 9.2, hematocrit 28.3, WBC 6.8, and platelets

of 266.  LFTs are essentially unremarkable.



IMPRESSION AND PLAN:  This 77-year-old patient admitted with pneumonia,

being failed outpatient treatment, being on antibiotics improved and

non-insulin-dependent diabetes mellitus, prostate cancer, and history of

atrial fibrillation.  The patient was on Eliquis, which has been now

discontinued at the present time.  The hemoglobin is stable, but stool for

occult blood is positive.  We would recommend at this time elective

esophagogastroduodenoscopy and a colonoscopy.  I had detailed discussion

with the patient who understood.  The patient did have an endoscopy done

few years ago in [facility], which had shown some abnormality .  He had a

colonoscopy at that time was negative.  In view of this anemia, occult

blood positive, he will benefit from both esophagogastroduodenoscopy and

colonoscopy.  Meanwhile, we will continue with the PPI, Protonix once

daily.  We will continue to closely followup.  Other comorbidities include

diabetes mellitus, dyslipidemia, hypertension, chronic obstructive

pulmonary disease, and prostate cancer.





__________________________________________

Elana Ramirez MD





DD:  12/29/2017 19:10:02

DT:  12/29/2017 19:42:07

Job # [id number]





STEPHAN

## 2017-12-29 NOTE — PN
DATE:  12/28/2017



SUBJECTIVE:  This patient was seen and evaluated earlier today.  The

patient is comfortable.  No evidence of bleeding per rectum.  No diarrhea,

tolerating the diet.



PHYSICAL EXAMINATION:

VITAL SIGNS:  Temperature 98.1, pulse 75, blood pressure is 138/74.

HEENT:  Atraumatic, anicteric.

NECK:  Supple.

HEART:  S1 and S2 heard.

LUNGS:  Bilateral air entry present.

ABDOMEN:  Soft.  There is no mass palpable.  No tenderness.



LABORATORY DATA:  Hemoglobin is 9.7, hematocrit 29.3, WBC is 9.3, platelets

of 257.  BUN 35, creatinine 0.5.



IMPRESSION AND PLAN:  This 77-year-old patient admitted with pneumonia,

failed outpatient therapy.



The patient has atrial fibrillation, now with anticoagulation, on Eliquis. 

The patient is still on Eliquis.  The patient was found to have stool for

occult blood positive, anemia,.  No obvious melena or bright red

blood per rectum now.



I had a detailed discussion with Dr. Pathak.  The concern is Eliquis if

Eliquis can be stopped.  This patient has anemia, occult

blood positive, history of atrial fibrillation, on anticoagulation.  The

concern is doing GI workup which will avoid doing it.  In view of the

recent respiratory infection, there is a small risk of aspiration and

complications during endoscopic evaluation.



Unless the patient has an active bleeding and significant drop in blood

count, we should defer doing an endoscopy at the present time.  Once the

respiratory status completely improved, can be arranged as an outpatient.



We would also get a cardiologic input whether the Eliquis can be stopped

for the procedure.



Thank you very much for allowing us to participate in the care of your

patient.





__________________________________________

Elana Ramirez MD



DD:  12/28/2017 22:02:07

DT:  12/28/2017 22:25:41

Job # [id number]



STEPHAN

## 2017-12-30 VITALS — OXYGEN SATURATION: 98 % | TEMPERATURE: 97.1 F

## 2017-12-30 VITALS — DIASTOLIC BLOOD PRESSURE: 83 MMHG | SYSTOLIC BLOOD PRESSURE: 153 MMHG | HEART RATE: 75 BPM

## 2017-12-30 RX ADMIN — INSULIN HUMAN SCH UNITS: 100 INJECTION, SOLUTION PARENTERAL at 12:52

## 2017-12-30 RX ADMIN — PANTOPRAZOLE SODIUM SCH MG: 40 TABLET, DELAYED RELEASE ORAL at 05:47

## 2017-12-30 RX ADMIN — IPRATROPIUM BROMIDE AND ALBUTEROL SULFATE SCH: .5; 3 SOLUTION RESPIRATORY (INHALATION) at 04:36

## 2017-12-30 RX ADMIN — IPRATROPIUM BROMIDE AND ALBUTEROL SULFATE SCH ML: .5; 3 SOLUTION RESPIRATORY (INHALATION) at 08:06

## 2017-12-30 RX ADMIN — INSULIN HUMAN SCH: 100 INJECTION, SOLUTION PARENTERAL at 06:30

## 2017-12-30 RX ADMIN — POTASSIUM CHLORIDE SCH MEQ: 10 TABLET, FILM COATED, EXTENDED RELEASE ORAL at 08:18

## 2017-12-30 RX ADMIN — IPRATROPIUM BROMIDE AND ALBUTEROL SULFATE SCH: .5; 3 SOLUTION RESPIRATORY (INHALATION) at 14:35

## 2017-12-30 NOTE — PN
DATE:  12/30/2017



SUBJECTIVE:  This patient was seen and evaluated earlier today.  The

patient is comfortable.



PHYSICAL EXAMINATION:

VITAL SIGNS:  Temperature is 97.1, blood pressure is 153/83, pulse 75.

HEENT:  Atraumatic, anicteric.

NECK:  Supple.

HEART:  S1 and S2 heard.

LUNGS:  Bilateral air entry present.

ABDOMEN:  Soft.  There is a reducible umbilical hernia present.



LABORATORY DATA:  No recent labs today.



IMPRESSION:  This 77-year-old patient with a history of prostate cancer

admitted with pneumonia, failed outpatient therapy, transient atrial

fibrillation, was on Eliquis which has been discontinued, found to be

anemic with guaiac-positive stool.



His hemoglobin now stable, presently on PPI.  Advised to continue that.



The patient did have an endoscopy and a colonoscopy done 4 years ago at

[facility].  He was told to have some abnormalities

in the endoscopic finding, did not have any follow up.



The patient would benefit from the outpatient endoscopy and also

colonoscopic evaluation.



We will defer at least for 2 to 3 weeks until he is optimized from this

recent respiratory infection.  The patient was advised to follow up with

the primary physician.  Advised GI followup.



Thank you very much for allowing us to participate in the care of the

patient.





__________________________________________

Elana Ramirez MD





DD:  12/30/2017 16:23:42

DT:  12/30/2017 18:29:06

Job # [id number]

## 2017-12-30 NOTE — PN
DATE:  12/30/2017



SUBJECTIVE:  The patient is in bed, in no acute distress, and nontoxic.



OBJECTIVE:

VITAL SIGNS:  Temperature is 98, blood pressure is 120/70, and respiratory

rate is 16.

HEENT:  Unremarkable.

NECK:  Supple.

LUNGS:  Decreased breath sounds.

HEART:  Normal S1 and S2.

ABDOMEN:  Soft and nontender.



LABORATORY DATA:  Reveals the patient's white count of 6.8, hemoglobin of

9, and platelets of 266.  Chemistries are noted.  Review of orders reveals

the patient to be on off of antibiotics.



ASSESSMENT AND PLAN:  This is a 77-year-old male who was seen early this

morning in room 304 who is nontoxic, history of obesity, body mass index of

34, hypertensive, diabetes, chronic obstructive lung disease, prostate

cancer, renal insufficiency, admitted with systemic inflammatory response

syndrome, negative cultures, received 8 days of doxycycline, normal

procalcitonin, and the patient feels better.  Currently off of antibiotics.

However, he is at risk for developing nosocomial infections and we will

follow closely with you.  The patient's wife is at bedside early this

morning.





__________________________________________

Félix Graham MD





DD:  12/30/2017 17:18:10

DT:  12/30/2017 17:21:47

Job # [id number]

## 2017-12-31 NOTE — DS
HISTORY OF PRESENT ILLNESS AND HOSPITAL COURSE:  The patient is a

77-year-old who came to emergency room because of cough, congestion,

leukocytosis.  He was being treated by Dr. Rahman for cough and congestion. 

He was given two rounds of Zithromax that resulted in diarrhea.  He was

having increasing weakness, perfused diarrhea, so he came to ER for further

evaluation.  He was found to have bilateral lower lobe infiltrate, has been

on IV antibiotic.  Upon arrival, he was in AFib.  He was given beta

blockers and was started on Eliquis.  Once his hypoxia improved, he was in

sinus rhythm.  He has been on IV antibiotic, doing well, transferred to TCU

for rehab and completion of course of antibiotic.



PHYSICAL EXAMINATION:  On examination today;

GENERAL:  He is awake, alert, oriented, able to communicate.

VITAL SIGNS:  Afebrile, pulse 75, respirations 20, blood pressure 146/75.

LUNGS:  Bilateral fair airflow.  No rhonchi or crackle.

HEART:  S1 and S2 audible.  Regular rate and rhythm.

ABDOMEN:  Soft, nontender.  No rebound.  No guarding.

NEUROLOGIC:  He is awake, alert, oriented, communicative.

EXTREMITIES:  Bilateral leg +2 edema.



His stool for Hemoccult is positive.



ASSESSMENT AND PLAN:

1.  Status post community-acquired pneumonia, failed outpatient treatment.

2.  Brief episode of atrial fibrillation.

3.  Hypertension.

4.  History of gastrointestinal bleed.



The patient states his last upper endoscopy and colonoscopy was 5 years ago

and was totally embarrassed.  He need to have a repeat endoscopy and

colonoscopy.  Dr. Ramirez was consulted patient since patient just

recovered from pneumonia.  He should recover for 3-4 weeks and plan is to

have him evaluated by Dr. Ramirez in 3-4 weeks.  The patient was given

instructions to follow with Dr. Beth in 3 weeks and Dr. Ramirez in 4

weeks to have endoscopy and colonoscopy done and he need to be evaluated by

Dr. Bteh to evaluate him for atrial fibrillation that he need long-term

anticoagulation or not.  The patient was given an information of Dr. Ramirez and Dr. Beth and upon discharge, he need to be on his usual

medication that include amlodipine 10 mg daily, valsartan 320 daily,

Uloric, Flomax 0.4 daily, sildenafil as needed, Prandin 0.5 b.i.d.,

potassium 30 mEq twice a day, Protonix 40 mg daily, Tradjenta 5 mg daily,

insulin as needed.  He is on glimepiride 4 mg twice a day, Lasix 40 mg

daily.  He will follow with his PMD.





__________________________________________

Audie Pathak MD





DD:  12/30/2017 18:18:30

DT:  12/30/2017 18:20:28

Job # [id number]

## 2018-09-27 ENCOUNTER — HOSPITAL ENCOUNTER (INPATIENT)
Dept: HOSPITAL 42 - ED | Age: 78
LOS: 5 days | Discharge: NURSING, REHAB OR LONG-TERM CARE | DRG: 190 | End: 2018-10-02
Attending: INTERNAL MEDICINE | Admitting: INTERNAL MEDICINE
Payer: MEDICARE

## 2018-09-27 VITALS — BODY MASS INDEX: 34.3 KG/M2

## 2018-09-27 DIAGNOSIS — E66.01: ICD-10-CM

## 2018-09-27 DIAGNOSIS — I48.0: ICD-10-CM

## 2018-09-27 DIAGNOSIS — J44.1: Primary | ICD-10-CM

## 2018-09-27 DIAGNOSIS — Z87.891: ICD-10-CM

## 2018-09-27 DIAGNOSIS — Z85.820: ICD-10-CM

## 2018-09-27 DIAGNOSIS — I13.0: ICD-10-CM

## 2018-09-27 DIAGNOSIS — E87.1: ICD-10-CM

## 2018-09-27 DIAGNOSIS — E11.22: ICD-10-CM

## 2018-09-27 DIAGNOSIS — N40.0: ICD-10-CM

## 2018-09-27 DIAGNOSIS — J18.9: ICD-10-CM

## 2018-09-27 DIAGNOSIS — J44.0: ICD-10-CM

## 2018-09-27 DIAGNOSIS — Z79.01: ICD-10-CM

## 2018-09-27 DIAGNOSIS — T38.0X5A: ICD-10-CM

## 2018-09-27 DIAGNOSIS — I08.2: ICD-10-CM

## 2018-09-27 DIAGNOSIS — Z79.84: ICD-10-CM

## 2018-09-27 DIAGNOSIS — N18.9: ICD-10-CM

## 2018-09-27 DIAGNOSIS — G47.33: ICD-10-CM

## 2018-09-27 DIAGNOSIS — I50.9: ICD-10-CM

## 2018-09-27 DIAGNOSIS — E78.5: ICD-10-CM

## 2018-09-27 DIAGNOSIS — M10.9: ICD-10-CM

## 2018-09-27 DIAGNOSIS — E11.65: ICD-10-CM

## 2018-09-27 LAB
ALBUMIN SERPL-MCNC: 3.3 G/DL (ref 3–4.8)
ALBUMIN/GLOB SERPL: 1.1 {RATIO} (ref 1.1–1.8)
ALT SERPL-CCNC: 23 U/L (ref 7–56)
APPEARANCE UR: CLEAR
ARTERIAL BLOOD GAS HEMOGLOBIN: 11.1 G/DL (ref 11.7–17.4)
ARTERIAL BLOOD GAS O2 SAT: 98.5 % (ref 95–98)
ARTERIAL BLOOD GAS PCO2: 41 MM/HG (ref 35–45)
ARTERIAL BLOOD GAS TCO2: 28.5 MMOL.L (ref 22–28)
AST SERPL-CCNC: 19 U/L (ref 17–59)
BACTERIA #/AREA URNS HPF: (no result) /[HPF]
BASOPHILS # BLD AUTO: 0.01 K/MM3 (ref 0–2)
BASOPHILS NFR BLD: 0.1 % (ref 0–3)
BILIRUB UR-MCNC: NEGATIVE MG/DL
BNP SERPL-MCNC: 1470 PG/ML (ref 0–450)
BNP SERPL-MCNC: 1510 PG/ML (ref 0–450)
BUN SERPL-MCNC: 29 MG/DL (ref 7–21)
CALCIUM SERPL-MCNC: 8.2 MG/DL (ref 8.4–10.5)
COLOR UR: YELLOW
EOSINOPHIL # BLD: 0.3 10*3/UL (ref 0–0.7)
EOSINOPHIL NFR BLD: 4.7 % (ref 1.5–5)
EPI CELLS #/AREA URNS HPF: (no result) /HPF (ref 0–5)
ERYTHROCYTE [DISTWIDTH] IN BLOOD BY AUTOMATED COUNT: 14.3 % (ref 11.5–14.5)
GFR NON-AFRICAN AMERICAN: 42
GLUCOSE UR STRIP-MCNC: 100 MG/DL
GRANULOCYTES # BLD: 5.08 10*3/UL (ref 1.4–6.5)
GRANULOCYTES NFR BLD: 73.2 % (ref 50–68)
HCO3 BLDA-SCNC: 27.2 MMOL/L (ref 21–28)
HDLC SERPL-MCNC: 28 MG/DL (ref 29–60)
HGB BLD-MCNC: 10.9 G/DL (ref 14–18)
INHALED O2 CONCENTRATION: 40 %
LDLC SERPL-MCNC: 91 MG/DL (ref 0–129)
LEUKOCYTE ESTERASE UR-ACNC: NEGATIVE LEU/UL
LYMPHOCYTES # BLD: 0.9 10*3/UL (ref 1.2–3.4)
LYMPHOCYTES NFR BLD AUTO: 12.9 % (ref 22–35)
MCH RBC QN AUTO: 27 PG (ref 25–35)
MCHC RBC AUTO-ENTMCNC: 33.4 G/DL (ref 31–37)
MCV RBC AUTO: 80.7 FL (ref 80–105)
MONOCYTES # BLD AUTO: 0.6 10*3/UL (ref 0.1–0.6)
MONOCYTES NFR BLD: 9.1 % (ref 1–6)
O2 CAP BLDA-SCNC: 15.6 ML/DL (ref 16–24)
O2 CT BLDA-SCNC: 15.4 ML/DL (ref 15–23)
PH BLDA: 7.43 [PH] (ref 7.35–7.45)
PH UR STRIP: 6 [PH] (ref 4.7–8)
PLATELET # BLD: 195 10^3/UL (ref 120–450)
PMV BLD AUTO: 10 FL (ref 7–11)
PO2 BLDA: 151 MM/HG (ref 80–100)
PROT UR STRIP-MCNC: (no result) MG/DL
RBC # BLD AUTO: 4.04 10^6/UL (ref 3.5–6.1)
RBC # UR STRIP: NEGATIVE /UL
RBC #/AREA URNS HPF: (no result) /HPF (ref 0–2)
SP GR UR STRIP: 1.01 (ref 1–1.03)
TROPONIN I SERPL-MCNC: 0.01 NG/ML
TROPONIN I SERPL-MCNC: < 0.01 NG/ML
UROBILINOGEN UR STRIP-ACNC: 0.2 E.U./DL
WBC # BLD AUTO: 7 10^3/UL (ref 4.5–11)
WBC #/AREA URNS HPF: (no result) /HPF (ref 0–6)

## 2018-09-27 RX ADMIN — INSULIN LISPRO SCH UNIT: 100 INJECTION, SOLUTION INTRAVENOUS; SUBCUTANEOUS at 22:44

## 2018-09-27 RX ADMIN — POTASSIUM CHLORIDE SCH MEQ: 10 TABLET, FILM COATED, EXTENDED RELEASE ORAL at 17:57

## 2018-09-27 RX ADMIN — METHYLPREDNISOLONE SODIUM SUCCINATE SCH MG: 40 INJECTION, POWDER, FOR SOLUTION INTRAMUSCULAR; INTRAVENOUS at 22:43

## 2018-09-27 RX ADMIN — IPRATROPIUM BROMIDE AND ALBUTEROL SULFATE SCH: .5; 3 SOLUTION RESPIRATORY (INHALATION) at 20:00

## 2018-09-27 NOTE — CARD
--------------- APPROVED REPORT --------------





Date of service: 09/27/2018



EKG Measurement

Heart [id number]

OR [id number]

[id number]

[id number]

[id number]



<Conclusion>

Normal sinus rhythm

Left anterior fascicular block

PRWP

STTW changes

## 2018-09-27 NOTE — ED PDOC
Arrival/HPI





- General


Chief Complaint: Shortness Of Breath


Time Seen by Provider: 09/27/18 12:03


Historian: Patient





- History of Present Illness


Narrative History of Present Illness (Text): 





09/27/18 12:07


78 year old male, whose past medical history includes mild renal insufficiency, 

history of melanoma, history of basal cell carcinoma, hypertension, NIDDM, and 

COPD, presents to the emergency department from PMD office complaining of 

shortness of breath associated with cough for the past 2 weeks. Patient reports 

the shortness of breath with exertion which is relieved when sitting down. 

Patient quit smoking 20 years ago. He also reports 2 stress test done in the 

past which resulted in normal findings and denies any recent change in 

medication. Patient also states he currently takes a water pill. Patient denies 

any fever, chills, chest pain, nausea, abdominal pain, vomiting, diarrhea, 

urinary symptoms, back pain, neck pain, headache, dizziness, or any other 

complaints. 





PMD: Dr. Solis


Cardiolgoist: Dr. Beth


Nephrologist: Dr. Mock








Time/Duration: Other (2 weeks)


Symptom Onset: Gradual


Symptom Course: Unchanged


Activities at Onset: Light


Context: Home





Past Medical History





- Provider Review


Nursing Documentation Reviewed: Yes





- Cardiac


Hx Hypertension: Yes





- Pulmonary


Hx Chronic Obstructive Pulmonary Disease (COPD): Yes





- Neurological


HX Cerebrovascular Accident: No





- HEENT


Hx HEENT Disorder:  (READING,TONSILLECTOMY)





- Renal


Hx Renal Failure: No





- Endocrine/Metabolic


Hx Diabetes Mellitus Type 2: Yes





- Hematological/Oncological


Hx Blood Disorders: No


Hx AIDS: No


Hx Anemia: Yes


Hx Cancer: Yes (Prostate, Melanoma)


Hx Chemotherapy: No


Hx Cirrhosis: No


Hx Hemophilia: No


Hx Hepatitis A: No


Hx Hepatitis B: No


Hx Hepatitis C: No


Hx Metastasis: No


Hx Shingles: No


Hx Sickle Cell Disease: No


Hx Unexplained Bleeding: No





- Integumentary


Hx Dermatological Disorder: Yes


Hx Basal Cell Carcinoma: Yes


Hx Eczema: Yes


Hx Melanoma: Yes


Hx Psoriasis: No


Hx Squamous Cell Carcinoma: Yes





- Musculoskeletal/Rheumatological


Hx Falls: No





- Gastrointestinal


Hx Gastrointestinal Disorders: No





- Genitourinary/Gynecological


Hx Genitourinary Disorders: Yes (Kidney dysfunction)





- Psychiatric


Hx Depression: No


Hx Emotional Abuse: No


Hx Physical Abuse: No


Hx Substance Use: No





- Surgical History


Hx Amputation: No


Hx Appendectomy: No


Hx Cardiac Catheterization: No


Hx Cholecystectomy: No


Hx Coronary Stent: No


Hx Gastric Bypass Surgery: No


Hx Hysterectomy: No


Hx Joint Replacement: No


Hx Kidney Transplant: No


Hx Liver Transplant: No


Hx Mastectomy: No


Hx Musculoskeletal Surgery: No


Hx Open Heart Surgery: No


Hx Orthopedic Surgery: No


Hx Splenectomy: No


Hx Valve Replacement: No





- Anesthesia


Hx Anesthesia: Yes





- Suicidal Assessment


Feels Threatened In Home Enviroment: No





Family/Social History





- Physician Review


Nursing Documentation Reviewed: Yes


Family/Social History: No Known Family HX


Smoking Status: Former Smoker


Hx Alcohol Use: No


Hx Substance Use: No


Hx Substance Use Treatment: No





Allergies/Home Meds


Allergies/Adverse Reactions: 


Allergies





atorvastatin Allergy (Verified 12/28/17 07:19)


   RASH


Penicillins Allergy (Verified 12/28/17 07:19)


   PAIN


procaine Allergy (Verified 12/28/17 07:19)


   RASH


propoxyphene Allergy (Verified 12/28/17 07:19)


   RASH


darvon Allergy (Severe, Uncoded 12/22/17 23:18)


   PAIN


   palpitations 


novacaine Allergy (Severe, Uncoded 12/22/17 23:18)


   PAIN


   palpitations 








Home Medications: 


                                    Home Meds











 Medication  Instructions  Recorded  Confirmed


 


Aspirin 81 mg PO DAILY 07/30/12 12/18/17


 


Bethanechol Chloride [Bethanechol] 25 mg PO BID 07/30/12 12/18/17


 


Carvedilol [Coreg] 12.5 mg PO BID 07/30/12 12/18/17


 


Doxercalciferol [Hectorol] 2.5 mcg PO DAILY 07/30/12 12/18/17


 


Eplerenone [Inspra] 25 mg PO DAILY 07/30/12 12/18/17


 


Ezetimibe [Zetia] 10 mg PO DAILY 07/30/12 12/18/17


 


Finasteride 5 mg PO DAILY 07/30/12 12/18/17


 


Furosemide 20 mg PO BID 07/30/12 12/18/17


 


Glimepiride 2 mg PO BID 07/30/12 12/18/17


 


Nisoldipine [Sular] 25.5 mg PO DAILY 07/30/12 12/18/17


 


Sildenafil Citrate [Viagra] 100 mg PO PRN PRN 07/30/12 12/18/17


 


Tamsulosin [Flomax] 0.4 mg PO BID 07/30/12 12/18/17


 


Acarbose [Precose] 50 mg PO TID 12/18/17 12/18/17


 


Chlorthalidone 0.5 tab PO MWF 12/18/17 12/18/17


 


Colchicine 0.6 PO DAILY PRN 12/18/17 


 


Ergocalciferol (Vitamin D2) PO MON 12/18/17 





[Vitamin D2]   


 


Icosapent Ethyl [Vascepa] 1 gm PO BID 12/18/17 12/18/17


 


Irbesartan [Avapro] 300 mg PO MWF 12/18/17 12/18/17


 


Linagliptin [Tradjenta] 5 mg PO DAILY 12/18/17 12/18/17


 


Omeprazole 40 DAILY 12/18/17 


 


Repaglinide [Prandin] 0.5 mg PO DAILY 12/18/17 12/18/17


 


Uloric 80 PRN PRN 12/18/17 














Review of Systems





- Physician Review


All systems were reviewed & negative as marked: Yes





- Review of Systems


Constitutional: absent: Fevers, Other (Chills)


Respiratory: SOB, Cough


Cardiovascular: MONTALVO.  absent: Chest Pain


Gastrointestinal: absent: Abdominal Pain, Diarrhea, Nausea, Vomiting


Genitourinary Male: absent: Dysuria, Frequency, Hematuria


Musculoskeletal: absent: Back Pain, Neck Pain


Neurological: absent: Headache, Dizziness





Physical Exam


Vital Signs Reviewed: Yes





Vital Signs











  Temp Pulse Resp BP Pulse Ox


 


 09/27/18 11:55  98.2 F  79  18  148/92 H  93 L











Temperature: Afebrile


Blood Pressure: Normal


Pulse: Regular


Respiratory Rate: Normal


Appearance: Positive for: Well-Appearing, Non-Toxic, Comfortable, Other (Hypoxic

 upon arrival)


Pain Distress: None


Mental Status: Positive for: Alert and Oriented X 3





- Systems Exam


Head: Present: Atraumatic, Normocephalic


Pupils: Present: PERRL


Extroacular Muscles: Present: EOMI


Conjunctiva: Present: Normal


Mouth: Present: Moist Mucous Membranes


Neck: Present: Normal Range of Motion


Respiratory/Chest: Present: Wheezes (Bilateral in the anterior and posterior 

lung fields), Rhonchi (Bilateral in the anterior and posterior lung fields).  

No: Respiratory Distress, Accessory Muscle Use, Retracting, Tachypneic


Cardiovascular: Present: Regular Rate and Rhythm, Normal S1, S2.  No: Murmurs, 

Tachycardic


Abdomen: Present: Distention.  No: Tenderness, Peritoneal Signs


Back: Present: Normal Inspection


Upper Extremity: Present: Normal Inspection.  No: Cyanosis, Edema


Lower Extremity: Present: Edema (+1 pitting edema ), Capillary Refill < 2 s


Neurological: Present: GCS=15, CN II-XII Intact, Speech Normal


Skin: Present: Warm, Dry, Normal Color.  No: Rashes


Psychiatric: Present: Alert, Oriented x 3, Normal Insight, Normal Concentration





Medical Decision Making


ED Course and Treatment: 





09/27/18 12:07


Impression:


78 year old male presents complaining of shortness of breath worsen on exertion 

associated with cough for the past 2 weeks. 





Differential Diagnosis included but are not limited to:  


CHF exacerbation 


COPD


Pneumonia





Plan:


-- ABG 


-- Labs


-- CXR


-- Duoneb


-- Solu-medrol


-- Blood Culture


-- BIPAP


-- Urinalysis 


-- Reassess and disposition





Progress Notes:


09/27/18 13:20


Case discussed with Dr. Pathak who is aware and agrees with the plan. Accepts 

patient into her service. 


09/27/18 13:52


Labs reviewed with hyponatremia, elevated creatinine concerning for MIGUEL and 

slight hypoxemia on ABG. Patient tolerating BiPAP without difficulty with BiPaP 

settings adjusted. 








- Lab Interpretations


I have reviewed the lab results: Yes





- RAD Interpretation


Narrative RAD Interpretations (Text): 








PROCEDURE: Chest X-ray 


Dictator : Makenzie Lizama MD


Report Date : 09/27/2018 12:32:16


IMPRESSION:


No acute findings identified.  See above.








: Radiologist





- EKG Interpretation


EKG Interpretation (Text): 





09/27/18 11:59


EKG shows NSR at 79 BPM withLAFD, no ST/T elevation. Interpreted by me. 





Interpreted by ED Physician: Yes


Type: 12 lead EKG





- Scribe Statement


The provider has reviewed the documentation as recorded by the Dharmeshibfredis Galaviz





Provider Scribe Attestation:


All medical record entries made by the Scribe were at my direction and 

personally dictated by me. I have reviewed the chart and agree that the record 

accurately reflects my personal performance of the history, physical exam, 

medical decision making, and the department course for this patient. I have also

 personally directed, reviewed, and agree with the discharge instructions and 

disposition.








Disposition/Present on Arrival





- Present on Arrival


Any Indicators Present on Arrival: No


History of DVT/PE: No


History of Uncontrolled Diabetes: No


Urinary Catheter: No


History of Decub. Ulcer: No


History Surgical Site Infection Following: None





- Disposition


Have Diagnosis and Disposition been Completed?: Yes


Diagnosis: 


 CHF (congestive heart failure), COPD (chronic obstructive pulmonary disease)





Disposition Time: 13:52


Patient Plan: Admission


Patient Problems: 


                             Current Active Problems











Problem Status Onset


 


CHF (congestive heart failure) Acute 


 


COPD (chronic obstructive pulmonary disease) Acute 











Condition: FAIR


Discharge Instructions (ExitCare):  Heart Failure (ED)


Referrals: 


Yuval Solis MD [Primary Care Provider] - Follow up with primary


Forms:  [employer] (English)

## 2018-09-27 NOTE — RAD
HISTORY:

 sob 



COMPARISON:

Chest x-ray performed 12/22/17 



TECHNIQUE:

Chest, one view.



FINDINGS:

Examination limited by habitus.



LUNGS:

No focal consolidation.



Please note that chest x-ray has limited sensitivity for the 

detection of pulmonary masses.



PLEURA:

No significant pleural effusion identified. No definite pneumothorax .



CARDIOVASCULAR:

Heart size appears top normal.  Ectatic aorta.



OSSEOUS STRUCTURES:

Degenerative changes.



VISUALIZED UPPER ABDOMEN:

Unremarkable.



OTHER FINDINGS:

None.



IMPRESSION:

No acute findings identified.  See above.

## 2018-09-27 NOTE — PCM.SEPTIC
<Yaron Luna - Last Filed: 09/27/18 20:51>





Sepsis Progress Note





- Reassessment Type


Date of Evaluation: 09/27/18


Time of Evaluation: 20:50


Reassessment Type: Non-invasive reassessment





- Non Invasive Reassessment


Were the most recent vital sign reviewed: Yes


Vital Sign (Latest): 


                                        











Temp Pulse Resp BP Pulse Ox


 


 98.4 F   89   18   151/69 H  96 


 


 09/27/18 14:35  09/27/18 19:35  09/27/18 18:34  09/27/18 19:35  09/27/18 18:34








Cardiovascular: Yes: Regular Rate, Rhythm


Respiratory: Yes: Wheezing.  No: Accessory Muscle Use, Stridor


Capillary Refill: Normal (Less than 2 sec)


Pulses: Normal Radial, Normal Dorsalis Pedis, Normal Posterior Tibialis


Skin: Normal Color, Warm





- Invasive Reassessment (complete 2 of 4)


Was a Central Venous Pressure Measurement obtained within 6 Hours after the 

presentation of septic shock: No


Was a central venous oxygen measurement obtained within 6 hours after the 

presentation of septic shock: No


Was a bedside cardiovascular ultrasound performed within 6 hours after the 

presentation of septic shock: No


Was a passive leg raise performed or was a fluid challenge performed within 6 

hrs of the initial fluid bolus: No


Passive Leg Raise Result: Not Applicable


Fluid Challenge performed: Yes





<Tesha Alatorre - Last Filed: 09/28/18 00:46>





Sepsis Progress Note





- Non Invasive Reassessment


Vital Sign (Latest): 


                                        











Temp Pulse Resp BP Pulse Ox


 


 98.4 F   95 H  18   166/81 H  96 


 


 09/27/18 22:35  09/27/18 22:35  09/27/18 22:35  09/27/18 22:35  09/27/18 18:34











Attending/Attestation





- Attestation


I have personally seen and examined this patient.: No


I have fully participated in the care of the patient.: Yes


I have reviewed all pertinent clinical information, including history, physical 

exam and plan: Yes

## 2018-09-28 LAB
ALBUMIN SERPL-MCNC: 3.4 G/DL (ref 3–4.8)
ALBUMIN/GLOB SERPL: 1.2 {RATIO} (ref 1.1–1.8)
ALT SERPL-CCNC: 24 U/L (ref 7–56)
AST SERPL-CCNC: 16 U/L (ref 17–59)
BASOPHILS # BLD AUTO: 0.01 K/MM3 (ref 0–2)
BASOPHILS NFR BLD: 0.2 % (ref 0–3)
BUN SERPL-MCNC: 31 MG/DL (ref 7–21)
CALCIUM SERPL-MCNC: 8.3 MG/DL (ref 8.4–10.5)
EOSINOPHIL # BLD: 0 10*3/UL (ref 0–0.7)
EOSINOPHIL NFR BLD: 0 % (ref 1.5–5)
ERYTHROCYTE [DISTWIDTH] IN BLOOD BY AUTOMATED COUNT: 14.3 % (ref 11.5–14.5)
GFR NON-AFRICAN AMERICAN: 45
GRANULOCYTES # BLD: 5.54 10*3/UL (ref 1.4–6.5)
GRANULOCYTES NFR BLD: 86.4 % (ref 50–68)
HGB BLD-MCNC: 11.3 G/DL (ref 14–18)
LIPASE SERPL-CCNC: 59 U/L (ref 23–300)
LYMPHOCYTES # BLD: 0.6 10*3/UL (ref 1.2–3.4)
LYMPHOCYTES NFR BLD AUTO: 9.7 % (ref 22–35)
MCH RBC QN AUTO: 26.7 PG (ref 25–35)
MCHC RBC AUTO-ENTMCNC: 33.2 G/DL (ref 31–37)
MCV RBC AUTO: 80.4 FL (ref 80–105)
MONOCYTES # BLD AUTO: 0.2 10*3/UL (ref 0.1–0.6)
MONOCYTES NFR BLD: 3.7 % (ref 1–6)
PLATELET # BLD: 210 10^3/UL (ref 120–450)
PMV BLD AUTO: 10.4 FL (ref 7–11)
RBC # BLD AUTO: 4.23 10^6/UL (ref 3.5–6.1)
T4 FREE SERPL-MCNC: 1.23 NG/DL (ref 0.78–2.19)
WBC # BLD AUTO: 6.4 10^3/UL (ref 4.5–11)

## 2018-09-28 PROCEDURE — 3E0F7GC INTRODUCTION OF OTHER THERAPEUTIC SUBSTANCE INTO RESPIRATORY TRACT, VIA NATURAL OR ARTIFICIAL OPENING: ICD-10-PCS | Performed by: INTERNAL MEDICINE

## 2018-09-28 PROCEDURE — 5A09457 ASSISTANCE WITH RESPIRATORY VENTILATION, 24-96 CONSECUTIVE HOURS, CONTINUOUS POSITIVE AIRWAY PRESSURE: ICD-10-PCS | Performed by: INTERNAL MEDICINE

## 2018-09-28 RX ADMIN — METHYLPREDNISOLONE SODIUM SUCCINATE SCH MG: 40 INJECTION, POWDER, FOR SOLUTION INTRAMUSCULAR; INTRAVENOUS at 21:54

## 2018-09-28 RX ADMIN — INSULIN LISPRO SCH UNIT: 100 INJECTION, SOLUTION INTRAVENOUS; SUBCUTANEOUS at 12:30

## 2018-09-28 RX ADMIN — POTASSIUM CHLORIDE SCH MEQ: 10 TABLET, FILM COATED, EXTENDED RELEASE ORAL at 09:05

## 2018-09-28 RX ADMIN — METHYLPREDNISOLONE SODIUM SUCCINATE SCH MG: 40 INJECTION, POWDER, FOR SOLUTION INTRAMUSCULAR; INTRAVENOUS at 09:04

## 2018-09-28 RX ADMIN — INSULIN LISPRO SCH UNIT: 100 INJECTION, SOLUTION INTRAVENOUS; SUBCUTANEOUS at 08:40

## 2018-09-28 RX ADMIN — PANTOPRAZOLE SODIUM SCH: 40 TABLET, DELAYED RELEASE ORAL at 05:30

## 2018-09-28 RX ADMIN — INSULIN LISPRO SCH: 100 INJECTION, SOLUTION INTRAVENOUS; SUBCUTANEOUS at 22:00

## 2018-09-28 RX ADMIN — IPRATROPIUM BROMIDE AND ALBUTEROL SULFATE SCH ML: .5; 3 SOLUTION RESPIRATORY (INHALATION) at 02:05

## 2018-09-28 RX ADMIN — IPRATROPIUM BROMIDE AND ALBUTEROL SULFATE SCH ML: .5; 3 SOLUTION RESPIRATORY (INHALATION) at 15:55

## 2018-09-28 RX ADMIN — INSULIN LISPRO SCH: 100 INJECTION, SOLUTION INTRAVENOUS; SUBCUTANEOUS at 17:29

## 2018-09-28 RX ADMIN — PANTOPRAZOLE SODIUM SCH MG: 40 TABLET, DELAYED RELEASE ORAL at 05:29

## 2018-09-28 RX ADMIN — POTASSIUM CHLORIDE SCH MEQ: 10 TABLET, FILM COATED, EXTENDED RELEASE ORAL at 17:44

## 2018-09-28 RX ADMIN — IPRATROPIUM BROMIDE AND ALBUTEROL SULFATE SCH ML: .5; 3 SOLUTION RESPIRATORY (INHALATION) at 19:16

## 2018-09-28 RX ADMIN — IPRATROPIUM BROMIDE AND ALBUTEROL SULFATE SCH ML: .5; 3 SOLUTION RESPIRATORY (INHALATION) at 08:04

## 2018-09-28 NOTE — CT
Date of service: 



09/28/2018



PROCEDURE:  CT Chest without contrast



HISTORY:

sob



COMPARISON:

None available.



TECHNIQUE:

Contiguous axial images were obtained through the chest without 

intravenous contrast enhancement. Sagittal and coronal 

reconstructions were performed.







Radiation dose (DLP): 731.3 mGy-cm. 



This CT exam was performed using one or more of the following dose 

reduction techniques: Automated exposure control, adjustment of the 

mA and/or kV according to patient size, and/or use of iterative 

reconstruction technique.



FINDINGS:



LUNGS:

 Tree-in-bud opacities in the right lower and middle lobes.  2 mm 

right apical nodule (series 3, image 13).  3 mm left apical nodule 

(series 3, image 15).  Visualized airway clear.



MEDIASTINUM:

Unremarkable thoracic aorta. No aneurysm. Cardiomegaly.  Coronary 

arterial and valvular calcifications.  Main pulmonary artery 

unremarkable. No vascular congestion. No lymphadenopathy.



PLEURA:

No pleural fluid. No pneumothorax.



BONES:

No fracture.  Degenerative changes.  No destructive lesion. 



UPPER ABDOMEN:

Grossly unremarkable.



OTHER FINDINGS:

None.



IMPRESSION:

Tree-in-bud opacities in the right lower and middle lobes, likely 

infectious/inflammatory in etiology.



Less than 6 mm biapical pulmonary nodules.  Twelve month CT follow-up 

can be obtained in high-risk patient.

## 2018-09-28 NOTE — HP
HISTORY OF PRESENT ILLNESS:  The patient is 78 years old, seen and examined

while in the emergency room, came to emergency room because of increasing

shortness of breath, unable to catch up his breath, has been going on for a

week and a half or so and he was trying to take his medication with no

significant relief, so he came to emergency room for further evaluation. 

Denies any fever or chills.  No history of hemoptysis.  No hematemesis.  No

abdominal pain.  He does not have any chest pain either.  Patient states he

was seen by Dr. Solis today and he advised him to come to emergency room.



PAST MEDICAL HISTORY:  Significant for;

1.  COPD.

2.  Hypertension.

3.  Renal insufficiency.

4.  History of melanoma and basal cell carcinoma resection.

5.  Non-insulin-dependent diabetes.



ALLERGIES:  PATIENT IS ALLERGIC TO;

1.  ATORVASTATIN.

2.  PENICILLIN.

3.  PROCAINE.

4.  PROPOXYPHENE.

5.  DARVON.

6.  NOVOCAIN.



MEDICATIONS:  At home, he is on amlodipine 10 mg daily, Flomax 0.4 daily,

Prandin 0.5 three times a day, potassium 10 mEq daily, Protonix 40 mg

daily, Zofran p.r.n., glimepiride 4 mg twice a day, finasteride 5 mg daily,

carvedilol 12.5 twice a day, bethanechol, aspirin 81 daily, Eliquis 5 mg

twice a day, Tylenol as needed, Diovan one 320 daily, Uloric 80 mg daily,

tamsulosin 0.4 daily, Viagra 100 mg as needed, omeprazole 40 mg daily.  He

is on insulin and glimepiride and also he takes Zetia, vitamin D and

nebulizer treatment.



SOCIAL HISTORY:  He used to be smoker in the past.



REVIEW OF SYSTEMS:  Significant for cough, congestion and shortness of

breath.



PHYSICAL EXAMINATION:

GENERAL:  He is awake, alert, oriented, communicative.

VITAL SIGNS:  He is afebrile, pulse 73, respirations 18, blood pressure

146/85.

LUNGS:  Bilateral fair airflow.  Few expiratory rhonchi, basilar crackles.

HEART:  S1 and S2 audible.

ABDOMEN:  Soft, obese, nontender.  No rebound.  No guarding.

NEUROLOGICAL:  He is awake, alert, oriented, communicative.

EXTREMITIES:  Bilateral legs, +2 edema.



LABORATORY EXAM:  WBC is 7, hemoglobin 10.9, hematocrit 32.6, platelet 195.

Chemistry:  Sodium 131, potassium 3.9, chloride 96, CO2 of 30, BUN 29,

creatinine 1.6.  Blood sugar of 152.  Calcium 8.2, magnesium 2.3.  LFTs are

within normal limit.  Alk phos 146.  Urine shows trace protein and positive

glucose.



DIAGNOSTIC DATA:  X-ray of chest shows no acute finding.  EKG shows normal

sinus rhythm.



ASSESSMENT:

1.  Paroxysmal atrial fibrillation.

2.  Congestive heart failure.

3.  Chronic obstructive pulmonary disease exacerbation.

4.  Asthmatic bronchitis.

5.  Non-insulin-dependent diabetes.

6.  Morbid obesity.

7.  History of basal cell carcinoma.



PLAN:  Patient will be admitted on telemetry.  We will start him on IV

steroids.  Start him on nebulizer treatment.  We will start him on

diuretics.  We will follow up his electrolytes.  Monitor his blood sugar. 

Dr. Acosta has been consulted also.







__________________________________________

Audie Pathak MD



DD:  09/27/2018 17:24:56

DT:  09/28/2018 0:56:13

Job # [id number]

## 2018-09-28 NOTE — CON
DATE:  09/28/2018



CONSULTATION INDICATION:  Shortness of breath.



HISTORY OF PRESENT ILLNESS:  This is a 78-year-old man known to me,

admitted with progressive shortness of breath and cough.  He was seen by

Dr. Solis in the office and sent to the emergency room.  Subsequently, he

was admitted to telemetry with exacerbation of COPD, possible CHF.  He has

a recent history of pneumonia and was admitted in 12/2017.  He had

paroxysmal atrial fibrillation at that time.  Today he is resting

comfortably in bed with a BiPAP mask.  His wife is at the bedside.  There

is no chest pain, syncope, palpitation, claudication, hemoptysis, abdominal

pain, nausea, vomiting, diarrhea, constipation, melena.



PAST MEDICAL HISTORY:  Notable for paroxysmal atrial fibrillation,

hypertension, diabetes, hyperlipidemia, chronic kidney disease, COPD,

recent pneumonia, BPH, skin cancers including basal cell cancer and melanoma

resections, gout.



SOCIAL HISTORY:  He is a former smoker.  There is no history of rheumatic

fever, myocardial infarction, stroke or TIA.



MEDICATIONS AT THE TIME OF ADMISSION:  Include amlodipine, Flomax, Prandin,

potassium, Protonix, Zofran glimepiride, finasteride, carvedilol,

bethanechol, aspirin, Eliquis, Tylenol, Diovan, Uloric, Flomax, omeprazole,

Zetia, vitamin D, chlorthalidone, Sular, Advair, Avapro, Hectorol, Inspra,

Precose, Proscar, Stiolto, Tradjenta.



ALLERGIES:  HE NOTES ALLERGIES TO SEVERAL MEDICATIONS INCLUDING PENICILLIN,

PROCAINE, PROPOXYPHENE, NOVOCAIN, LIPITOR.



SOCIAL HISTORY:  He lives at home with his wife.  He is ambulatory, but

limited.  He is a former smoker.  He does not drink alcohol significantly.



FAMILY HISTORY:  Noncontributory.



REVIEW OF SYSTEMS:  A 10-point review of systems is otherwise unremarkable

except as noted above.



PHYSICAL EXAMINATION:

GENERAL:  He is a well-developed elderly male with a BiPAP mask on, on

telemetry, in no acute distress.

VITAL SIGNS:  Sinus rhythm is 67 beats per minute, afebrile.  Blood

pressure 182/83, respirations 18-20, O2 sat % on BiPAP mask.

HEENT:  Reveals no neck vein distention, thyromegaly, carotid bruit. 

Mucous membranes moist.  Conjunctiva pink.

NECK:  Supple.

LUNGS:  Lung fields, scattered rhonchi.

HEART:  Revealed normal first and second heart sounds which was somewhat

distant.

ABDOMEN:  Soft.  Bowel sounds present.  No mass, organomegaly, tenderness,

rebound, guarding.  No CVA tenderness.  No palpable abdominal aortic

aneurysm.

EXTREMITIES:  Revealed no cyanosis, clubbing.  There is mild-to-moderate

lower extremity edema which is somewhat chronic appearing.

NEUROLOGIC:  He is awake, alert, oriented.

SKIN:  Warm and dry.  No rash or cellulitis.

PSYCHIATRIC:  Normal as to mood and affect.



LABORATORY AND IMAGING:  The EKG demonstrates regular sinus rhythm, left

anterior hemiblock, poor R-wave progression.  No acute changes or change

from a prior EKG.  Chest x-ray reveals no acute findings.  White count

normal, hemoglobin 11.3, hematocrit 34, platelet count normal.  Blood gas

is noted.  Electrolytes, BUN, creatinine notable for BUN 31, creatinine

1.5, blood sugars in the 200-300 range.  Troponin negative.  BNP 1470. 

LFTs mildly elevated, cholesterol 162, LDL 91, triglycerides 176, lipase

normal.  Thyroid functions normal.  Urinalysis is noted.



IMPRESSION:  Spencer Olvera is a 78-year-old man admitted with shortness of

breath, probably exacerbation of chronic obstruction of pulmonary disease. 

He has a history of multiple medical problems with pneumonia in 12/2017,

paroxysmal atrial fibrillation, hypertension, diabetes, hyperlipidemia,

chronic kidney disease, benign prostatic hyperplasia and gout.



At this time, I agree with current plans.  He has been cultured.  He is

getting antibiotics, respiratory treatments, continuous positive airway

pressure.  I will continue his cardiac medications including Coreg,

aspirin, Eliquis, amlodipine.  He had a dose of IV Lasix.  I would switch

that to p.o.  He can be out of bed to chair with PT.  We will review his old

records.  An echocardiogram in 12/2017 revealed normal left ventricular

function, mild aortic stenosis, mild aortic insufficiency and moderate

tricuspid regurgitation.



I will follow along with you.  I will make additional recommendations based

on his clinical course.







__________________________________________

Felice Beth MD



DD:  09/28/2018 8:36:35

DT:  09/28/2018 8:40:04

Job # [id number]

STEPHAN

## 2018-09-28 NOTE — PN
DATE:  09/28/2018



SUBJECTIVE:  The patient is 78 years old, seen and examined, less shortness

of breath.  He states he feels better.



PHYSICAL EXAMINATION:

VITAL SIGNS:  He is afebrile, pulse 83, respirations 18, blood pressure

165/77.

LUNGS:  Bilateral expiratory rhonchi.  Soft crackle at bases.

HEART:  S1 and S2 audible.

ABDOMEN:  Soft, obese, nontender.  No rebound.  No guarding.

NEUROLOGICAL:  The patient is awake, alert, oriented, able to communicate.

EXTREMITIES:  Bilateral leg, +1 edema.



LABORATORY EXAM:  WBC 6.4, hemoglobin 11.3, hematocrit 34, platelet 210. 

Chemistry:  Sodium 133, potassium 4.8, chloride 97, CO2 of 27, BUN 31,

creatinine 1.5, blood sugar of 237.  His x-ray chest has no acute findings.



ASSESSMENT;

1.  Chronic obstructive pulmonary disease exacerbation.

2.  Congestive heart failure.

3.  Morbid obesity.

4.  History of atrial fibrillation, currently in sinus rhythm.

5.  Noninsulin-dependent diabetes.

6.  Benign prostatic hypertrophy.



PLAN:  We will continue the patient on current medications.  We will

continue him on nebulizer treatment.  He is on aspirin.  He is on Flomax. 

I will continue him on Lasix.  I will order for CT of the chest to rule out

underlying pneumonia.  We will continue cardiac monitoring for now.  We

will follow up the patient in the a.m.





__________________________________________

Audie Pathak MD





DD:  09/28/2018 13:12:17

DT:  09/28/2018 14:08:08

Job # [id number]

## 2018-09-29 LAB
BUN SERPL-MCNC: 39 MG/DL (ref 7–21)
CALCIUM SERPL-MCNC: 8.8 MG/DL (ref 8.4–10.5)
GFR NON-AFRICAN AMERICAN: 45

## 2018-09-29 RX ADMIN — INSULIN LISPRO SCH UNIT: 100 INJECTION, SOLUTION INTRAVENOUS; SUBCUTANEOUS at 08:45

## 2018-09-29 RX ADMIN — IPRATROPIUM BROMIDE AND ALBUTEROL SULFATE SCH ML: .5; 3 SOLUTION RESPIRATORY (INHALATION) at 01:00

## 2018-09-29 RX ADMIN — INSULIN LISPRO SCH UNIT: 100 INJECTION, SOLUTION INTRAVENOUS; SUBCUTANEOUS at 17:48

## 2018-09-29 RX ADMIN — POTASSIUM CHLORIDE SCH MEQ: 10 TABLET, FILM COATED, EXTENDED RELEASE ORAL at 09:57

## 2018-09-29 RX ADMIN — INSULIN LISPRO SCH UNIT: 100 INJECTION, SOLUTION INTRAVENOUS; SUBCUTANEOUS at 12:46

## 2018-09-29 RX ADMIN — PANTOPRAZOLE SODIUM SCH MG: 40 TABLET, DELAYED RELEASE ORAL at 06:42

## 2018-09-29 RX ADMIN — IPRATROPIUM BROMIDE AND ALBUTEROL SULFATE SCH ML: .5; 3 SOLUTION RESPIRATORY (INHALATION) at 13:27

## 2018-09-29 RX ADMIN — IPRATROPIUM BROMIDE AND ALBUTEROL SULFATE SCH ML: .5; 3 SOLUTION RESPIRATORY (INHALATION) at 07:18

## 2018-09-29 RX ADMIN — POTASSIUM CHLORIDE SCH: 10 TABLET, FILM COATED, EXTENDED RELEASE ORAL at 17:43

## 2018-09-29 RX ADMIN — METHYLPREDNISOLONE SODIUM SUCCINATE SCH MG: 40 INJECTION, POWDER, FOR SOLUTION INTRAMUSCULAR; INTRAVENOUS at 09:56

## 2018-09-29 RX ADMIN — IPRATROPIUM BROMIDE AND ALBUTEROL SULFATE SCH ML: .5; 3 SOLUTION RESPIRATORY (INHALATION) at 19:40

## 2018-09-29 RX ADMIN — INSULIN LISPRO SCH: 100 INJECTION, SOLUTION INTRAVENOUS; SUBCUTANEOUS at 23:04

## 2018-09-29 RX ADMIN — METHYLPREDNISOLONE SODIUM SUCCINATE SCH MG: 40 INJECTION, POWDER, FOR SOLUTION INTRAMUSCULAR; INTRAVENOUS at 23:09

## 2018-09-29 NOTE — PN
DATE:  09/29/2018



SUBJECTIVE:  The patient is 78 years old, seen and examined, doing better.



PHYSICAL EXAMINATION:

VITAL SIGNS:  The patient is afebrile, pulse 72, respirations 20, blood

pressure 156/72.

LUNGS:  Bilateral good airflow.  No rhonchi or crackle.

HEART:  S1 and S2 audible.

ABDOMEN:  Soft, obese, nontender.  No rebound.  No guarding.

NEUROLOGICAL:  He is awake and alert, able to communicate.

EXTREMITIES:  Bilateral leg, +1 edema.



LABORATORY EXAM:  WBC is 6.4, hemoglobin 11.3, hematocrit 34, platelet of

210.  Chemistry:  Sodium 134, potassium 4.5, chloride 98, CO2 of 27, BUN

39, creatinine 1.5, blood sugar of 270.  Blood culture, urine cultures were

negative.  Had CT scan of the chest done that shows right middle and lower

lobe pneumonia.



ASSESSMENT:

1.  Chronic obstructive pulmonary disease exacerbation.

2.  Congestive heart failure.

3.  Obstructive sleep apnea.

4.  Renal insufficiency.



PLAN:  Currently, the patient is on IV steroids.  We will add an

antibiotic.  Start him on Levaquin.  Continue nebulizer treatment and IV

steroid.  Request for physical therapy evaluation.  We will try to switch

to 2 L nasal cannula if he can hold his pulse ox above 92%.





__________________________________________

Audie Pathak MD





DD:  09/29/2018 17:00:06

DT:  09/29/2018 17:16:30

Job # [id number]

## 2018-09-29 NOTE — CP.PCM.PN
Subjective





- Date & Time of Evaluation


Date of Evaluation: 09/29/18


Time of Evaluation: 07:00





- Subjective


Subjective: 


Stable on 2R. He feels better. No CP or SOB. The cough is better.





V/S noted. RSR





PE:





Lungs: rhonchi


Cor.: S1S2


Abd.: soft


Ext.: + edema


Neuro.: alert





Labs, BS 's noted.





BC X2 NG at 24 hrs.





CT Chest noted.








Objective





- Vital Signs/Intake and Output


Vital Signs (last 24 hours): 


                                        











Temp Pulse Resp BP Pulse Ox


 


 97.5 F L  71   20   178/86 H  97 


 


 09/29/18 00:00  09/29/18 02:00  09/29/18 00:00  09/29/18 00:00  09/29/18 00:00








Intake and Output: 


                                        











 09/29/18 09/29/18





 06:59 18:59


 


Intake Total 0 


 


Output Total 300 


 


Balance -300 














- Medications


Medications: 


                               Current Medications





Acetaminophen (Tylenol 325mg Tab)  650 mg PO Q6H PRN


   PRN Reason: Fever >100.4 F


Albuterol/Ipratropium (Duoneb 3 Mg/0.5 Mg (3 Ml) Ud)  3 ml IH Q2H PRN


   PRN Reason: Shortness of Breath


Albuterol/Ipratropium (Duoneb 3 Mg/0.5 Mg (3 Ml) Ud)  3 ml IH [id number] [facility]


   Last Admin: 09/29/18 01:00 Dose:  3 ml


Amlodipine Besylate (Norvasc)  10 mg PO DAILY [facility]


   Last Admin: 09/28/18 09:05 Dose:  10 mg


Aspirin (Ecotrin)  81 mg PO DAILY [facility]


   Last Admin: 09/28/18 09:05 Dose:  81 mg


Bethanechol Chloride (Urecholine)  25 mg PO BID [facility]


   Last Admin: 09/28/18 17:46 Dose:  25 mg


Carvedilol (Coreg)  12.5 mg PO BID [facility]


   Last Admin: 09/28/18 17:44 Dose:  12.5 mg


Finasteride (Proscar)  5 mg PO DAILY [facility]


   Last Admin: 09/28/18 09:03 Dose:  5 mg


Finasteride (Proscar)  5 mg PO DAILY [facility]


   Last Admin: 09/28/18 09:04 Dose:  Not Given


Furosemide (Lasix)  40 mg IVP DAILY [facility]


   Last Admin: 09/28/18 09:06 Dose:  40 mg


Furosemide (Lasix)  40 mg PO DAILY [facility]


Glimepiride (Amaryl)  4 mg PO BID [facility]


   Last Admin: 09/28/18 17:45 Dose:  Not Given


Insulin Human Lispro (Humalog High)  0 units SC ACHS [facility]; Protocol


   Last Admin: 09/28/18 22:00 Dose:  Not Given


Methylprednisolone (Solu-Medrol)  30 mg IV Q12 [facility]


   Last Admin: 09/28/18 21:54 Dose:  30 mg


Ondansetron HCl (Zofran Inj)  4 mg IVP Q6H PRN


   PRN Reason: Nausea/Vomiting


Pantoprazole Sodium (Protonix Ec Tab)  40 mg PO 0630 [facility]


   Last Admin: 09/29/18 06:42 Dose:  40 mg


Pantoprazole Sodium (Protonix Ec Tab)  40 mg PO 0630 [facility]


   Last Admin: 09/28/18 05:30 Dose:  Not Given


Potassium Chloride (Klor-Con 10)  10 meq PO BID [facility]


   Last Admin: 09/28/18 17:44 Dose:  10 meq


Repaglinide (Prandin)  0.5 mg PO DAILY [facility]


   Last Admin: 09/28/18 09:04 Dose:  0.5 mg


Tamsulosin HCl (Flomax)  0.4 mg PO BID [facility]


   Last Admin: 09/28/18 17:44 Dose:  0.4 mg











- Labs


Labs: 


                                        





                                 09/28/18 06:30 





                                 09/28/18 06:30 











Assessment and Plan





- Assessment and Plan (Free Text)


Assessment: 





Dyspnea, Cough


COPD with decompensation, R/O pneumonia, R/O CHF


LLOYD


PAF


HBP


HLD


CKD


BPH


Skin cancers: basal cell, melanoma


Former Smoker


Echo 12/17: Nl LV fx., Mild AS and AI, Mod. TR








Plan:





AM labs pending.


Pulm Tx.


As per Dr. Pathak.


OOB/PT


CPAP at night


Cultures pending


Monitor: labs, I/O, sats., BS's, etc.

## 2018-09-30 LAB
ALBUMIN SERPL-MCNC: 3.4 G/DL (ref 3–4.8)
ALBUMIN/GLOB SERPL: 1.2 {RATIO} (ref 1.1–1.8)
ALT SERPL-CCNC: 20 U/L (ref 7–56)
AST SERPL-CCNC: 24 U/L (ref 17–59)
BUN SERPL-MCNC: 47 MG/DL (ref 7–21)
CALCIUM SERPL-MCNC: 8.4 MG/DL (ref 8.4–10.5)
GFR NON-AFRICAN AMERICAN: 45

## 2018-09-30 RX ADMIN — METHYLPREDNISOLONE SODIUM SUCCINATE SCH MG: 40 INJECTION, POWDER, FOR SOLUTION INTRAMUSCULAR; INTRAVENOUS at 21:02

## 2018-09-30 RX ADMIN — INSULIN LISPRO SCH UNIT: 100 INJECTION, SOLUTION INTRAVENOUS; SUBCUTANEOUS at 21:55

## 2018-09-30 RX ADMIN — IPRATROPIUM BROMIDE AND ALBUTEROL SULFATE SCH ML: .5; 3 SOLUTION RESPIRATORY (INHALATION) at 09:44

## 2018-09-30 RX ADMIN — IPRATROPIUM BROMIDE AND ALBUTEROL SULFATE SCH ML: .5; 3 SOLUTION RESPIRATORY (INHALATION) at 14:20

## 2018-09-30 RX ADMIN — POTASSIUM CHLORIDE SCH: 10 TABLET, FILM COATED, EXTENDED RELEASE ORAL at 11:30

## 2018-09-30 RX ADMIN — METHYLPREDNISOLONE SODIUM SUCCINATE SCH MG: 40 INJECTION, POWDER, FOR SOLUTION INTRAMUSCULAR; INTRAVENOUS at 10:33

## 2018-09-30 RX ADMIN — LEVOFLOXACIN SCH MLS/HR: 5 INJECTION, SOLUTION INTRAVENOUS at 21:02

## 2018-09-30 RX ADMIN — IPRATROPIUM BROMIDE AND ALBUTEROL SULFATE SCH ML: .5; 3 SOLUTION RESPIRATORY (INHALATION) at 19:38

## 2018-09-30 RX ADMIN — PANTOPRAZOLE SODIUM SCH: 40 TABLET, DELAYED RELEASE ORAL at 20:16

## 2018-09-30 RX ADMIN — INSULIN LISPRO SCH UNIT: 100 INJECTION, SOLUTION INTRAVENOUS; SUBCUTANEOUS at 17:33

## 2018-09-30 RX ADMIN — POTASSIUM CHLORIDE SCH: 10 TABLET, FILM COATED, EXTENDED RELEASE ORAL at 18:37

## 2018-09-30 RX ADMIN — INSULIN LISPRO SCH: 100 INJECTION, SOLUTION INTRAVENOUS; SUBCUTANEOUS at 08:11

## 2018-09-30 RX ADMIN — PANTOPRAZOLE SODIUM SCH MG: 40 TABLET, DELAYED RELEASE ORAL at 05:54

## 2018-09-30 RX ADMIN — IPRATROPIUM BROMIDE AND ALBUTEROL SULFATE SCH ML: .5; 3 SOLUTION RESPIRATORY (INHALATION) at 02:30

## 2018-09-30 RX ADMIN — INSULIN LISPRO SCH UNIT: 100 INJECTION, SOLUTION INTRAVENOUS; SUBCUTANEOUS at 12:31

## 2018-09-30 NOTE — PN
DATE:  09/30/2018



SUBJECTIVE:  The patient is 78 years old, seen and examined, doing well,

sitting in chair, still has cough, mild shortness of breath.



PHYSICAL EXAMINATION

VITAL SIGNS:  He is afebrile, pulse 76, respirations 20, blood pressure

146/85.

LUNGS:  Bilateral soft crackle and expiratory rhonchi.

HEART:  S1 and S2 audible.

ABDOMEN:  Soft, obese, nontender.  No rebound.  No guarding.

NEUROLOGICAL:  The patient is awake, alert, oriented, communicative.

EXTREMITIES:  Bilateral legs, +1 edema.



LABORATORY DATA:  Sodium 131, potassium 4.5, chloride 95, CO2 of 27, BUN

47, creatinine 1.5, blood sugar of 209.



ASSESSMENT AND PLAN:

1.  Chronic obstructive pulmonary disease exacerbation.

2.  Congestive heart failure.

3.  Right lower lobe and middle lobe pneumonia.

4.  Morbid obesity.

5.  Obstructive sleep apnea.

6.  Paroxysmal atrial fibrillation.  The patient had atrial fibrillation in

the previous admission because of demand ischemia; however, he has been in

regular sinus rhythm.



The patient is hemodynamically stable.  We will discontinue his telemetry. 

Continue nebulizer treatment.  Continue IV steroids.  We will follow up the

patient.





__________________________________________

Audie Pathak MD



DD:  09/30/2018 14:28:55

DT:  09/30/2018 18:44:18

Job # [id number]

## 2018-09-30 NOTE — CP.PCM.PN
Subjective





- Date & Time of Evaluation


Date of Evaluation: 09/30/18


Time of Evaluation: 07:00





- Subjective


Subjective: 


Stable on 2R. He feels better. No CP or SOB. The cough is better.





V/S noted. RSR





PE:





Lungs: rhonchi


Cor.: S1S2


Abd.: soft


Ext.: + edema


Neuro.: alert





Labs, BS 's noted. 9/29 Cr.= 1.5





BC X2 NG at 48 hrs.





CT Chest noted.














Objective





- Vital Signs/Intake and Output


Vital Signs (last 24 hours): 


                                        











Temp Pulse Resp BP Pulse Ox


 


 97.4 F L  74   20   171/87 H  97 


 


 09/30/18 06:00  09/30/18 06:00  09/30/18 06:00  09/30/18 06:00  09/30/18 00:00








Intake and Output: 


                                        











 09/30/18 09/30/18





 06:59 18:59


 


Intake Total 0 


 


Output Total 3300 


 


Balance -3300 














- Medications


Medications: 


                               Current Medications





Acetaminophen (Tylenol 325mg Tab)  650 mg PO Q6H PRN


   PRN Reason: Fever >100.4 F


Albuterol/Ipratropium (Duoneb 3 Mg/0.5 Mg (3 Ml) Ud)  3 ml IH Q2H PRN


   PRN Reason: Shortness of Breath


Albuterol/Ipratropium (Duoneb 3 Mg/0.5 Mg (3 Ml) Ud)  3 ml IH [id number] [facility]


   Last Admin: 09/30/18 02:30 Dose:  3 ml


Amlodipine Besylate (Norvasc)  10 mg PO DAILY [facility]


   Last Admin: 09/29/18 09:57 Dose:  10 mg


Aspirin (Ecotrin)  81 mg PO DAILY [facility]


   Last Admin: 09/29/18 09:57 Dose:  81 mg


Bethanechol Chloride (Urecholine)  25 mg PO BID [facility]


   Last Admin: 09/29/18 17:49 Dose:  25 mg


Carvedilol (Coreg)  12.5 mg PO BID [facility]


   Last Admin: 09/29/18 17:49 Dose:  12.5 mg


Finasteride (Proscar)  5 mg PO DAILY [facility]


   Last Admin: 09/29/18 09:58 Dose:  5 mg


Finasteride (Proscar)  5 mg PO DAILY [facility]


   Last Admin: 09/29/18 10:00 Dose:  Not Given


Furosemide (Lasix)  40 mg PO DAILY [facility]


   Last Admin: 09/29/18 09:57 Dose:  40 mg


Glimepiride (Amaryl)  4 mg PO BID [facility]


   Last Admin: 09/29/18 17:49 Dose:  4 mg


Levofloxacin/Dextrose (Levaquin 500mg)  500 mg in 100 mls @ 100 mls/hr IVPB 

DAILY [facility]; Protocol


   Last Admin: 09/30/18 00:33 Dose:  100 mls/hr


Insulin Human Lispro (Humalog High)  0 units SC ACHS [facility]; Protocol


   Last Admin: 09/29/18 23:04 Dose:  Not Given


Methylprednisolone (Solu-Medrol)  30 mg IV Q12 [facility]


   Last Admin: 09/29/18 23:09 Dose:  30 mg


Ondansetron HCl (Zofran Inj)  4 mg IVP Q6H PRN


   PRN Reason: Nausea/Vomiting


Pantoprazole Sodium (Protonix Ec Tab)  40 mg PO 0630 [facility]


   Last Admin: 09/30/18 05:54 Dose:  40 mg


Potassium Chloride (Klor-Con 10)  10 meq PO BID [facility]


   Last Admin: 09/29/18 17:43 Dose:  Not Given


Repaglinide (Prandin)  0.5 mg PO DAILY [facility]


   Last Admin: 09/29/18 09:57 Dose:  0.5 mg


Tamsulosin HCl (Flomax)  0.4 mg PO BID [facility]


   Last Admin: 09/29/18 17:49 Dose:  0.4 mg











- Labs


Labs: 


                                        





                                 09/28/18 06:30 





                                 09/29/18 09:30 











Assessment and Plan





- Assessment and Plan (Free Text)


Assessment: 





Dyspnea, Cough


COPD with decompensation, R/O pneumonia, R/O CHF


LLOYD


PAF


HBP


HLD


CKD


BPH


Skin cancers: basal cell, melanoma


Former Smoker


Echo 12/17: Nl LV fx., Mild AS and AI, Mod. TR








Plan:








Pulm Tx.


AB,  as per Dr. Pathak.


OOB/PT


CPAP at night


Cultures pending


Can D/C tel

## 2018-10-01 LAB
BUN SERPL-MCNC: 46 MG/DL (ref 7–21)
CALCIUM SERPL-MCNC: 8.6 MG/DL (ref 8.4–10.5)
GFR NON-AFRICAN AMERICAN: 49

## 2018-10-01 RX ADMIN — IPRATROPIUM BROMIDE AND ALBUTEROL SULFATE SCH ML: .5; 3 SOLUTION RESPIRATORY (INHALATION) at 20:06

## 2018-10-01 RX ADMIN — LEVOFLOXACIN SCH MLS/HR: 5 INJECTION, SOLUTION INTRAVENOUS at 22:12

## 2018-10-01 RX ADMIN — INSULIN LISPRO SCH UNIT: 100 INJECTION, SOLUTION INTRAVENOUS; SUBCUTANEOUS at 08:45

## 2018-10-01 RX ADMIN — IPRATROPIUM BROMIDE AND ALBUTEROL SULFATE SCH ML: .5; 3 SOLUTION RESPIRATORY (INHALATION) at 02:11

## 2018-10-01 RX ADMIN — METHYLPREDNISOLONE SODIUM SUCCINATE SCH MG: 40 INJECTION, POWDER, FOR SOLUTION INTRAMUSCULAR; INTRAVENOUS at 09:03

## 2018-10-01 RX ADMIN — METHYLPREDNISOLONE SODIUM SUCCINATE SCH MG: 40 INJECTION, POWDER, FOR SOLUTION INTRAMUSCULAR; INTRAVENOUS at 22:11

## 2018-10-01 RX ADMIN — INSULIN LISPRO SCH: 100 INJECTION, SOLUTION INTRAVENOUS; SUBCUTANEOUS at 21:49

## 2018-10-01 RX ADMIN — POTASSIUM CHLORIDE SCH MEQ: 10 TABLET, FILM COATED, EXTENDED RELEASE ORAL at 18:01

## 2018-10-01 RX ADMIN — INSULIN LISPRO SCH UNIT: 100 INJECTION, SOLUTION INTRAVENOUS; SUBCUTANEOUS at 18:01

## 2018-10-01 RX ADMIN — IPRATROPIUM BROMIDE AND ALBUTEROL SULFATE SCH ML: .5; 3 SOLUTION RESPIRATORY (INHALATION) at 13:05

## 2018-10-01 RX ADMIN — IPRATROPIUM BROMIDE AND ALBUTEROL SULFATE SCH ML: .5; 3 SOLUTION RESPIRATORY (INHALATION) at 07:49

## 2018-10-01 RX ADMIN — INSULIN LISPRO SCH UNIT: 100 INJECTION, SOLUTION INTRAVENOUS; SUBCUTANEOUS at 12:48

## 2018-10-01 RX ADMIN — POTASSIUM CHLORIDE SCH MEQ: 10 TABLET, FILM COATED, EXTENDED RELEASE ORAL at 09:02

## 2018-10-01 RX ADMIN — IPRATROPIUM BROMIDE AND ALBUTEROL SULFATE SCH ML: .5; 3 SOLUTION RESPIRATORY (INHALATION) at 14:30

## 2018-10-01 RX ADMIN — PANTOPRAZOLE SODIUM SCH MG: 40 TABLET, DELAYED RELEASE ORAL at 05:29

## 2018-10-01 NOTE — CP.PCM.PN
Subjective





- Date & Time of Evaluation


Date of Evaluation: 10/01/18


Time of Evaluation: 07:00





- Subjective


Subjective: 





Stable on 2R. He feels better. No CP or SOB. The cough is better.





V/S noted.





PE:





Lungs: rhonchi


Cor.: S1S2


Abd.: soft


Ext.: + edema


Neuro.: alert





Labs, BS 's noted. 9/29 Cr.= 1.4





BC X2 NG at 3 days





CT Chest noted.








Objective





- Vital Signs/Intake and Output


Vital Signs (last 24 hours): 


                                        











Temp Pulse Resp BP Pulse Ox


 


 98.0 F   72   18   148/72   97 


 


 10/01/18 05:44  10/01/18 05:44  10/01/18 05:44  10/01/18 05:44  10/01/18 05:44








Intake and Output: 


                                        











 10/01/18 10/01/18





 06:59 18:59


 


Intake Total 2560 


 


Output Total 3050 


 


Balance -490 














- Medications


Medications: 


                               Current Medications





Acetaminophen (Tylenol 325mg Tab)  650 mg PO Q6H PRN


   PRN Reason: Fever >100.4 F


Albuterol/Ipratropium (Duoneb 3 Mg/0.5 Mg (3 Ml) Ud)  3 ml IH Q2H PRN


   PRN Reason: Shortness of Breath


Albuterol/Ipratropium (Duoneb 3 Mg/0.5 Mg (3 Ml) Ud)  3 ml IH [id number] [facility]


   Last Admin: 10/01/18 02:11 Dose:  3 ml


Amlodipine Besylate (Norvasc)  10 mg PO DAILY [facility]


   Last Admin: 09/30/18 10:32 Dose:  10 mg


Aspirin (Ecotrin)  81 mg PO DAILY [facility]


   Last Admin: 09/30/18 10:33 Dose:  81 mg


Bethanechol Chloride (Urecholine)  25 mg PO BID [facility]


   Last Admin: 09/30/18 17:32 Dose:  25 mg


Carvedilol (Coreg)  12.5 mg PO BID [facility]


   Last Admin: 09/30/18 17:32 Dose:  12.5 mg


Finasteride (Proscar)  5 mg PO DAILY [facility]


   Last Admin: 09/30/18 10:33 Dose:  5 mg


Finasteride (Proscar)  5 mg PO DAILY [facility]


   Last Admin: 09/30/18 10:34 Dose:  Not Given


Furosemide (Lasix)  40 mg PO DAILY [facility]


   Last Admin: 09/30/18 10:33 Dose:  40 mg


Glimepiride (Amaryl)  4 mg PO BID [facility]


   Last Admin: 09/30/18 17:33 Dose:  4 mg


Levofloxacin/Dextrose (Levaquin 500mg)  500 mg in 100 mls @ 100 mls/hr IVPB HS 

[facility]; Protocol


   Last Admin: 09/30/18 21:02 Dose:  100 mls/hr


Insulin Human Lispro (Humalog High)  0 units SC ACHS [facility]; Protocol


   Last Admin: 09/30/18 21:55 Dose:  2 unit


Methylprednisolone (Solu-Medrol)  30 mg IV Q12 [facility]


   Last Admin: 09/30/18 21:02 Dose:  30 mg


Ondansetron HCl (Zofran Inj)  4 mg IVP Q6H PRN


   PRN Reason: Nausea/Vomiting


Pantoprazole Sodium (Protonix Ec Tab)  40 mg PO 0630 [facility]


   Last Admin: 10/01/18 05:29 Dose:  40 mg


Potassium Chloride (Klor-Con 10)  10 meq PO BID [facility]


   Last Admin: 09/30/18 18:37 Dose:  Not Given


Repaglinide (Prandin)  2 mg PO WM [facility]


   Last Admin: 09/30/18 17:32 Dose:  2 mg


Tamsulosin HCl (Flomax)  0.4 mg PO BID [facility]


   Last Admin: 09/30/18 17:33 Dose:  0.4 mg











- Labs


Labs: 


                                        





                                 09/28/18 06:30 





                                 10/01/18 05:30 











Assessment and Plan





- Assessment and Plan (Free Text)


Assessment: 





Dyspnea, Cough


COPD with decompensation, R/O pneumonia, R/O CHF


LLOYD


PAF


HBP


HLD


CKD


BPH


Skin cancers: basal cell, melanoma


Former Smoker


Echo 12/17: Nl LV fx., Mild AS and AI, Mod. TR








Plan:








Pulm Tx.


AB,  as per Dr. Pathak.


OOB/PT


CPAP at night

## 2018-10-01 NOTE — PN
DATE:  10/01/2018



SUBJECTIVE:  The patient is 78 years old, seen and examined, less cough and

congestion, still coughing a lot, short of breath when walking.  No chest

pain.



PHYSICAL EXAMINATION:

VITAL SIGNS:  The patient is afebrile, pulse 73, respirations 18, blood

pressure 149/70.

LUNGS:  Bilateral soft crackle, diffuse, more so in the upper lung

posteriorly.

HEART:  S1 and S2 audible.

ABDOMEN:  Soft, obese, nontender.  No rebound.  No guarding.

NEUROLOGICAL:  The patient is awake, alert, oriented, communicative.



LABORATORY DATA:  Sodium 134, potassium 4.8, chloride 96, CO2 of 29, BUN

46, creatinine 1.4, blood sugar of 196.  Blood culture and urine cultures

are negative.



ASSESSMENT:

1.  Chronic obstructive pulmonary disease exacerbation.

2.  Congestive heart failure.

3.  Multilobar pneumonia.

4.  Morbid obesity.

5.  Right lower lobe and right middle lobe pneumonia.

6.  Non-insulin-dependent diabetes.



PLAN:  We will continue the patient on glimepiride, carvedilol.  He is on

nebulizer treatment.  He is on Lasix.  He is getting Levaquin.  Continue

him on prednisone.  Request for TCU evaluation; if accepted, he can be

transferred to TCU.







__________________________________________

Audie Pathak MD



DD:  10/01/2018 11:53:31

DT:  10/01/2018 12:22:55

Job # [id number]

## 2018-10-02 ENCOUNTER — HOSPITAL ENCOUNTER (INPATIENT)
Dept: HOSPITAL 42 - TRCU | Age: 78
LOS: 8 days | Discharge: HOME | DRG: 190 | End: 2018-10-10
Attending: INTERNAL MEDICINE | Admitting: INTERNAL MEDICINE
Payer: COMMERCIAL

## 2018-10-02 VITALS
RESPIRATION RATE: 19 BRPM | DIASTOLIC BLOOD PRESSURE: 89 MMHG | TEMPERATURE: 97.7 F | SYSTOLIC BLOOD PRESSURE: 179 MMHG | OXYGEN SATURATION: 95 % | HEART RATE: 76 BPM

## 2018-10-02 VITALS — BODY MASS INDEX: 35.7 KG/M2

## 2018-10-02 DIAGNOSIS — J18.9: ICD-10-CM

## 2018-10-02 DIAGNOSIS — Z85.820: ICD-10-CM

## 2018-10-02 DIAGNOSIS — E78.5: ICD-10-CM

## 2018-10-02 DIAGNOSIS — Z87.891: ICD-10-CM

## 2018-10-02 DIAGNOSIS — E66.01: ICD-10-CM

## 2018-10-02 DIAGNOSIS — J44.1: Primary | ICD-10-CM

## 2018-10-02 DIAGNOSIS — I11.0: ICD-10-CM

## 2018-10-02 DIAGNOSIS — J44.0: ICD-10-CM

## 2018-10-02 DIAGNOSIS — E11.9: ICD-10-CM

## 2018-10-02 DIAGNOSIS — I50.23: ICD-10-CM

## 2018-10-02 DIAGNOSIS — G47.33: ICD-10-CM

## 2018-10-02 PROCEDURE — 3E0F7GC INTRODUCTION OF OTHER THERAPEUTIC SUBSTANCE INTO RESPIRATORY TRACT, VIA NATURAL OR ARTIFICIAL OPENING: ICD-10-PCS | Performed by: INTERNAL MEDICINE

## 2018-10-02 RX ADMIN — LEVOFLOXACIN SCH MLS/HR: 5 INJECTION, SOLUTION INTRAVENOUS at 22:20

## 2018-10-02 RX ADMIN — INSULIN LISPRO SCH UNITS: 100 INJECTION, SOLUTION INTRAVENOUS; SUBCUTANEOUS at 17:58

## 2018-10-02 RX ADMIN — GUAIFENESIN AND DEXTROMETHORPHAN HYDROBROMIDE SCH TAB: 600; 30 TABLET, EXTENDED RELEASE ORAL at 18:01

## 2018-10-02 RX ADMIN — IPRATROPIUM BROMIDE AND ALBUTEROL SULFATE SCH ML: .5; 3 SOLUTION RESPIRATORY (INHALATION) at 13:24

## 2018-10-02 RX ADMIN — PANTOPRAZOLE SODIUM SCH MG: 40 TABLET, DELAYED RELEASE ORAL at 05:29

## 2018-10-02 RX ADMIN — METHYLPREDNISOLONE SODIUM SUCCINATE SCH MG: 40 INJECTION, POWDER, FOR SOLUTION INTRAMUSCULAR; INTRAVENOUS at 22:20

## 2018-10-02 RX ADMIN — INSULIN LISPRO SCH UNIT: 100 INJECTION, SOLUTION INTRAVENOUS; SUBCUTANEOUS at 07:54

## 2018-10-02 RX ADMIN — IPRATROPIUM BROMIDE AND ALBUTEROL SULFATE SCH ML: .5; 3 SOLUTION RESPIRATORY (INHALATION) at 03:43

## 2018-10-02 RX ADMIN — IPRATROPIUM BROMIDE AND ALBUTEROL SULFATE SCH ML: .5; 3 SOLUTION RESPIRATORY (INHALATION) at 07:59

## 2018-10-02 RX ADMIN — IPRATROPIUM BROMIDE AND ALBUTEROL SULFATE SCH ML: .5; 3 SOLUTION RESPIRATORY (INHALATION) at 20:43

## 2018-10-02 RX ADMIN — INSULIN LISPRO SCH UNITS: 100 INJECTION, SOLUTION INTRAVENOUS; SUBCUTANEOUS at 22:15

## 2018-10-02 RX ADMIN — POTASSIUM CHLORIDE SCH MEQ: 10 TABLET, FILM COATED, EXTENDED RELEASE ORAL at 10:45

## 2018-10-02 RX ADMIN — POTASSIUM CHLORIDE SCH MEQ: 10 TABLET, FILM COATED, EXTENDED RELEASE ORAL at 18:00

## 2018-10-02 RX ADMIN — INSULIN LISPRO SCH UNIT: 100 INJECTION, SOLUTION INTRAVENOUS; SUBCUTANEOUS at 12:54

## 2018-10-02 RX ADMIN — METHYLPREDNISOLONE SODIUM SUCCINATE SCH MG: 40 INJECTION, POWDER, FOR SOLUTION INTRAMUSCULAR; INTRAVENOUS at 10:45

## 2018-10-02 NOTE — CP.PCM.PN
Subjective





- Date & Time of Evaluation


Date of Evaluation: 10/02/18


Time of Evaluation: 07:00





- Subjective


Subjective: 





Stable on 3R. He feels better. No CP or SOB. The cough is much better.





V/S noted.





PE:





Lungs: rhonchi


Cor.: S1S2


Abd.: soft


Ext.: mild edema


Neuro.: alert





Labs 10/1 noted: BS 's noted,  Cr.= 1.4





BC X2 NG at 4 days





CT Chest noted.











Objective





- Vital Signs/Intake and Output


Vital Signs (last 24 hours): 


                                        











Temp Pulse Resp BP Pulse Ox


 


 97.6 F   73   18   173/87 H  98 


 


 10/01/18 16:56  10/01/18 18:05  10/01/18 16:56  10/01/18 18:05  10/01/18 16:56











- Medications


Medications: 


                               Current Medications





Acetaminophen (Tylenol 325mg Tab)  650 mg PO Q6H PRN


   PRN Reason: Fever >100.4 F


Albuterol/Ipratropium (Duoneb 3 Mg/0.5 Mg (3 Ml) Ud)  3 ml IH Q2H PRN


   PRN Reason: Shortness of Breath


Albuterol/Ipratropium (Duoneb 3 Mg/0.5 Mg (3 Ml) Ud)  3 ml IH [id number] [facility]


   Last Admin: 10/02/18 03:43 Dose:  3 ml


Amlodipine Besylate (Norvasc)  10 mg PO DAILY [facility]


   Last Admin: 10/01/18 09:02 Dose:  10 mg


Aspirin (Ecotrin)  81 mg PO DAILY [facility]


   Last Admin: 10/01/18 09:01 Dose:  81 mg


Bethanechol Chloride (Urecholine)  25 mg PO BID [facility]


   Last Admin: 10/01/18 18:01 Dose:  25 mg


Carvedilol (Coreg)  12.5 mg PO BID [facility]


   Last Admin: 10/01/18 18:05 Dose:  12.5 mg


Finasteride (Proscar)  5 mg PO DAILY [facility]


   Last Admin: 10/01/18 09:02 Dose:  5 mg


Furosemide (Lasix)  40 mg PO DAILY [facility]


   Last Admin: 10/01/18 09:02 Dose:  40 mg


Glimepiride (Amaryl)  4 mg PO BID [facility]


   Last Admin: 10/01/18 18:01 Dose:  4 mg


Levofloxacin/Dextrose (Levaquin 500mg)  500 mg in 100 mls @ 100 mls/hr IVPB HS 

[facility]; Protocol


   Last Admin: 10/01/18 22:12 Dose:  100 mls/hr


Insulin Human Lispro (Humalog High)  0 units SC ACHS [facility]; Protocol


   Last Admin: 10/01/18 21:49 Dose:  Not Given


Methylprednisolone (Solu-Medrol)  30 mg IV Q12 [facility]


   Last Admin: 10/01/18 22:11 Dose:  30 mg


Ondansetron HCl (Zofran Inj)  4 mg IVP Q6H PRN


   PRN Reason: Nausea/Vomiting


Pantoprazole Sodium (Protonix Ec Tab)  40 mg PO 0630 [facility]


   Last Admin: 10/02/18 05:29 Dose:  40 mg


Potassium Chloride (Klor-Con 10)  10 meq PO BID [facility]


   Last Admin: 10/01/18 18:01 Dose:  10 meq


Repaglinide (Prandin)  2 mg PO WM [facility]


   Last Admin: 10/01/18 18:01 Dose:  2 mg


Tamsulosin HCl (Flomax)  0.4 mg PO BID [facility]


   Last Admin: 10/01/18 18:01 Dose:  0.4 mg











- Labs


Labs: 


                                        





                                 09/28/18 06:30 





                                 10/01/18 05:30 











Assessment and Plan





- Assessment and Plan (Free Text)


Assessment: 





Dyspnea, Cough


COPD with decompensation, R/O pneumonia, R/O CHF


LLOYD


PAF


HBP


HLD


CKD


BPH


Skin cancers: basal cell, melanoma


Former Smoker


Echo 12/17: Nl LV fx., Mild AS and AI, Mod. TR








Plan:








Pulm Tx.


AB,  as per Dr. Pathak.


OOB/PT/TCU Eval.


CPAP at night

## 2018-10-03 PROCEDURE — F08Z4FZ HOME MANAGEMENT TREATMENT USING ASSISTIVE, ADAPTIVE, SUPPORTIVE OR PROTECTIVE EQUIPMENT: ICD-10-PCS | Performed by: INTERNAL MEDICINE

## 2018-10-03 PROCEDURE — F07Z9FZ GAIT TRAINING/FUNCTIONAL AMBULATION TREATMENT USING ASSISTIVE, ADAPTIVE, SUPPORTIVE OR PROTECTIVE EQUIPMENT: ICD-10-PCS | Performed by: INTERNAL MEDICINE

## 2018-10-03 RX ADMIN — INSULIN LISPRO SCH UNITS: 100 INJECTION, SOLUTION INTRAVENOUS; SUBCUTANEOUS at 17:28

## 2018-10-03 RX ADMIN — POTASSIUM CHLORIDE SCH MEQ: 10 TABLET, FILM COATED, EXTENDED RELEASE ORAL at 08:00

## 2018-10-03 RX ADMIN — IPRATROPIUM BROMIDE AND ALBUTEROL SULFATE SCH ML: .5; 3 SOLUTION RESPIRATORY (INHALATION) at 19:33

## 2018-10-03 RX ADMIN — GUAIFENESIN AND DEXTROMETHORPHAN HYDROBROMIDE SCH TAB: 600; 30 TABLET, EXTENDED RELEASE ORAL at 17:24

## 2018-10-03 RX ADMIN — LEVOFLOXACIN SCH MLS/HR: 5 INJECTION, SOLUTION INTRAVENOUS at 21:16

## 2018-10-03 RX ADMIN — PANTOPRAZOLE SODIUM SCH MG: 40 TABLET, DELAYED RELEASE ORAL at 05:35

## 2018-10-03 RX ADMIN — INSULIN LISPRO SCH UNITS: 100 INJECTION, SOLUTION INTRAVENOUS; SUBCUTANEOUS at 07:26

## 2018-10-03 RX ADMIN — INSULIN LISPRO SCH UNITS: 100 INJECTION, SOLUTION INTRAVENOUS; SUBCUTANEOUS at 12:07

## 2018-10-03 RX ADMIN — METHYLPREDNISOLONE SODIUM SUCCINATE SCH MG: 40 INJECTION, POWDER, FOR SOLUTION INTRAMUSCULAR; INTRAVENOUS at 10:06

## 2018-10-03 RX ADMIN — IPRATROPIUM BROMIDE AND ALBUTEROL SULFATE SCH ML: .5; 3 SOLUTION RESPIRATORY (INHALATION) at 02:34

## 2018-10-03 RX ADMIN — IPRATROPIUM BROMIDE AND ALBUTEROL SULFATE SCH ML: .5; 3 SOLUTION RESPIRATORY (INHALATION) at 07:16

## 2018-10-03 RX ADMIN — IPRATROPIUM BROMIDE AND ALBUTEROL SULFATE SCH ML: .5; 3 SOLUTION RESPIRATORY (INHALATION) at 13:08

## 2018-10-03 RX ADMIN — GUAIFENESIN AND DEXTROMETHORPHAN HYDROBROMIDE SCH TAB: 600; 30 TABLET, EXTENDED RELEASE ORAL at 10:04

## 2018-10-03 RX ADMIN — INSULIN LISPRO SCH UNITS: 100 INJECTION, SOLUTION INTRAVENOUS; SUBCUTANEOUS at 21:48

## 2018-10-03 RX ADMIN — POTASSIUM CHLORIDE SCH MEQ: 10 TABLET, FILM COATED, EXTENDED RELEASE ORAL at 17:24

## 2018-10-03 NOTE — HP
HISTORY OF PRESENT ILLNESS:  The patient is 78 years old who came and

developed cough, congestion, wheezing.  He was found to be having COPD

exacerbation and congestive heart failure, was evaluated by Dr. Beth, was

given IV diuretic, nebulizer treatment.  Started to improve.  Need physical

therapy, transferred to TCU for further close monitoring and physical

therapy.



PAST MEDICAL HISTORY:  Significant for;

1.  Morbid obesity.

2.  Hypertension.

3.  Hyperlipidemia.

4.  COPD.

5.  History of heavy smoking in the past.

6.  History of melanoma resection.



ALLERGIES:  HE IS ALLERGIC TO ATORVASTATIN, PENICILLIN, PROCAINE, AND

PROPOXYPHENE.



MEDICATIONS:  As per MAR.



SOCIAL HISTORY:  Heavy smoker in the past.  He is , lives with his

wife.



PHYSICAL EXAMINATION:

GENERAL:  He is awake and alert, able to communicative.

VITAL SIGNS:  He is afebrile, pulse 73, respiration 20, blood pressure

157/79.

LUNGS:  Bilateral expiratory rhonchi.

HEART:  S1, S2 audible.

ABDOMEN:  Soft, obese, and nontender.  No rebound, no guarding.

NEUROLOGICAL:  He is awake, alert, oriented, and communicative.  Ambulates

with physical therapist.



ASSESSMENT AND PLAN:  We will continue on nebulizer treatment.  Switch his

IV Solu-Medrol to p.o. prednisone.  Monitor blood sugar.  We will follow

the patient in a.m.





__________________________________________

Audie Pathak MD



DD:  10/03/2018 12:32:08

DT:  10/03/2018 19:07:33

Job # [id number]

## 2018-10-03 NOTE — DS
HISTORY OF PRESENT ILLNESS:  The patient is a 78-year-old who came in to

emergency room because of increasing cough, congestion, chest discomfort. 

The patient was found to have COPD exacerbation and workup showed that he

has bilobar pneumonia, also mild CHF.  So, the patient was given diuretic,

IV antibiotic, steroid.  The patient is known diabetic, so he has been

having high blood sugar because of steroid.  It was also monitored and

adjusted.  The patient is still having cough, congestion, wheezing, and

need some physical therapy.



PHYSICAL EXAMINATION:

GENERAL:  The patient is awake, alert, oriented, able to communicate.

VITAL SIGNS:  He is afebrile, pulse 76, respiration 19, blood pressure

179/89.

LUNGS:  Bilateral fair airflow.  Has diffuse bilateral soft crackle and

rhonchi.

HEART:  S1, S2 audible.

ABDOMEN:  Soft, obese, nontender.  No rebound, no guarding.

NEUROLOGIC:  The patient is awake, alert, oriented, communicative.

EXTREMITIES:  Moves all extremities.  Bilateral legs, +1 edema.



LABORATORY DATA:  Blood sugar is 209.



ASSESSMENT:

1.  Right middle and lower lobe pneumonia.

2.  Hypertension.

3.  Congestive heart failure.

4.  Morbid obesity.

5.  Non-insulin-dependent diabetes.

6.  History of melanoma resection.



PLAN:  We will continue the patient on nebulizer treatment.  Continue on

steroid.  Continue to monitor blood sugar.  He is on 30 mg of Solu-Medrol,

we will slowly taper it down and he will be transferred to TCU today later.





__________________________________________

Audie Pathak MD



DD:  10/02/2018 12:54:56

DT:  10/03/2018 2:37:40

Job # [id number]

## 2018-10-04 RX ADMIN — IPRATROPIUM BROMIDE AND ALBUTEROL SULFATE SCH ML: .5; 3 SOLUTION RESPIRATORY (INHALATION) at 13:02

## 2018-10-04 RX ADMIN — IPRATROPIUM BROMIDE AND ALBUTEROL SULFATE SCH ML: .5; 3 SOLUTION RESPIRATORY (INHALATION) at 01:59

## 2018-10-04 RX ADMIN — INSULIN LISPRO SCH UNITS: 100 INJECTION, SOLUTION INTRAVENOUS; SUBCUTANEOUS at 17:26

## 2018-10-04 RX ADMIN — PANTOPRAZOLE SODIUM SCH MG: 40 TABLET, DELAYED RELEASE ORAL at 05:17

## 2018-10-04 RX ADMIN — POTASSIUM CHLORIDE SCH MEQ: 10 TABLET, FILM COATED, EXTENDED RELEASE ORAL at 08:16

## 2018-10-04 RX ADMIN — GUAIFENESIN AND DEXTROMETHORPHAN HYDROBROMIDE SCH TAB: 600; 30 TABLET, EXTENDED RELEASE ORAL at 17:25

## 2018-10-04 RX ADMIN — INSULIN LISPRO SCH UNITS: 100 INJECTION, SOLUTION INTRAVENOUS; SUBCUTANEOUS at 06:43

## 2018-10-04 RX ADMIN — IPRATROPIUM BROMIDE AND ALBUTEROL SULFATE SCH ML: .5; 3 SOLUTION RESPIRATORY (INHALATION) at 07:08

## 2018-10-04 RX ADMIN — INSULIN LISPRO SCH UNITS: 100 INJECTION, SOLUTION INTRAVENOUS; SUBCUTANEOUS at 22:15

## 2018-10-04 RX ADMIN — INSULIN LISPRO SCH: 100 INJECTION, SOLUTION INTRAVENOUS; SUBCUTANEOUS at 11:57

## 2018-10-04 RX ADMIN — POTASSIUM CHLORIDE SCH MEQ: 10 TABLET, FILM COATED, EXTENDED RELEASE ORAL at 17:24

## 2018-10-04 RX ADMIN — GUAIFENESIN AND DEXTROMETHORPHAN HYDROBROMIDE SCH TAB: 600; 30 TABLET, EXTENDED RELEASE ORAL at 10:33

## 2018-10-04 NOTE — PN
DATE:  10/04/2018



SUBJECTIVE:  The patient is 78 years old, seen and examined, sitting in

chair, seems to be comfortable, still has cough and congestion, cough is

productive, shortness of breath on walking.



PHYSICAL EXAMINATION:

VITAL SIGNS:  He is afebrile, pulse 84, respirations 20, blood pressure

192/98.

LUNGS:  Bilateral soft crackle.

HEART:  S1 and S2 audible.

ABDOMEN:  Soft, obese, nontender.  No rebound.  No guarding.

NEUROLOGICAL:  The patient is awake, alert, oriented, communicative,

ambulatory.



LABORATORY DATA:  Blood sugar is 77.



ASSESSMENT:

1.  Chronic obstructive pulmonary disease exacerbation.

2.  Asthmatic bronchitis.

3.  Congestive heart failure.

4.  Morbid obesity.

5.  Non-insulin-dependent diabetes.

6.  Uncontrolled hypertension.



PLAN:  I will adjust medication to improve his blood pressure better. 

Continue nebulizer treatment, taper down steroid, encourage physical

therapy.  We will follow up the patient in a.m.







__________________________________________

Audie Pathak MD



DD:  10/04/2018 12:29:54

DT:  10/04/2018 16:31:27

Job # [id number]

## 2018-10-05 LAB
ALBUMIN SERPL-MCNC: 3 G/DL (ref 3–4.8)
ALBUMIN/GLOB SERPL: 1.1 {RATIO} (ref 1.1–1.8)
ALT SERPL-CCNC: 21 U/L (ref 7–56)
AST SERPL-CCNC: 15 U/L (ref 17–59)
BUN SERPL-MCNC: 45 MG/DL (ref 7–21)
CALCIUM SERPL-MCNC: 8.3 MG/DL (ref 8.4–10.5)
GFR NON-AFRICAN AMERICAN: 54

## 2018-10-05 RX ADMIN — IPRATROPIUM BROMIDE AND ALBUTEROL SULFATE SCH ML: .5; 3 SOLUTION RESPIRATORY (INHALATION) at 20:41

## 2018-10-05 RX ADMIN — IPRATROPIUM BROMIDE AND ALBUTEROL SULFATE SCH ML: .5; 3 SOLUTION RESPIRATORY (INHALATION) at 07:08

## 2018-10-05 RX ADMIN — PANTOPRAZOLE SODIUM SCH MG: 40 TABLET, DELAYED RELEASE ORAL at 05:41

## 2018-10-05 RX ADMIN — POTASSIUM CHLORIDE SCH MEQ: 10 TABLET, FILM COATED, EXTENDED RELEASE ORAL at 07:50

## 2018-10-05 RX ADMIN — IPRATROPIUM BROMIDE AND ALBUTEROL SULFATE SCH ML: .5; 3 SOLUTION RESPIRATORY (INHALATION) at 01:41

## 2018-10-05 RX ADMIN — INSULIN LISPRO SCH UNITS: 100 INJECTION, SOLUTION INTRAVENOUS; SUBCUTANEOUS at 17:46

## 2018-10-05 RX ADMIN — INSULIN LISPRO SCH UNITS: 100 INJECTION, SOLUTION INTRAVENOUS; SUBCUTANEOUS at 12:22

## 2018-10-05 RX ADMIN — POTASSIUM CHLORIDE SCH MEQ: 10 TABLET, FILM COATED, EXTENDED RELEASE ORAL at 17:43

## 2018-10-05 RX ADMIN — INSULIN LISPRO SCH UNITS: 100 INJECTION, SOLUTION INTRAVENOUS; SUBCUTANEOUS at 06:38

## 2018-10-05 RX ADMIN — GUAIFENESIN AND DEXTROMETHORPHAN HYDROBROMIDE SCH TAB: 600; 30 TABLET, EXTENDED RELEASE ORAL at 10:22

## 2018-10-05 RX ADMIN — IPRATROPIUM BROMIDE AND ALBUTEROL SULFATE SCH ML: .5; 3 SOLUTION RESPIRATORY (INHALATION) at 13:25

## 2018-10-05 RX ADMIN — INSULIN LISPRO SCH: 100 INJECTION, SOLUTION INTRAVENOUS; SUBCUTANEOUS at 23:04

## 2018-10-05 RX ADMIN — GUAIFENESIN AND DEXTROMETHORPHAN HYDROBROMIDE SCH: 600; 30 TABLET, EXTENDED RELEASE ORAL at 17:45

## 2018-10-05 NOTE — PN
DATE:  10/05/2018



SUBJECTIVE:  The patient is 78 years old, seen and examined, sitting in

chair, seems to be comfortable, still has cough and congestion.  Blood

pressure seems to be fluctuating.  Denies any nausea or vomiting.



PHYSICAL EXAMINATION

VITAL SIGNS:  He is afebrile, pulse 82, respirations 18, blood pressure

167/82.

LUNGS:  Bilateral soft crackle, diffuse.

HEART:  S1 and S2 audible.

ABDOMEN:  Soft, nontender.  No rebound.  No guarding.

NEUROLOGICAL:  The patient is able to communicate, ambulatory.



LABORATORY EXAM:  _____, potassium of 4.7, chloride 96, CO2 of 29.  BUN 45,

creatinine 1.3.  Blood sugar of 183.



ASSESSMENT:

1.  Chronic obstructive pulmonary disease exacerbation.

2.  Hypertension.

3.  Non-insulin-dependent diabetes.

4.  Congestive heart failure.



PLAN:  Currently, the patient is on carvedilol 12.5 daily.  We will

increase it to twice a day, thus how he takes at home.  He is on losartan

100 daily.  We will continue that.  He is on amlodipine 10 mg daily, we

will also continue that.  Cut down his steroid to _____ daily.  Encourage

physical therapy.  I will start clonidine, and monitor blood pressure and

blood sugar closely.







__________________________________________

Audie Pathak MD



DD:  10/05/2018 11:45:27

DT:  10/05/2018 14:51:21

Job # [id number]

## 2018-10-06 RX ADMIN — GUAIFENESIN AND DEXTROMETHORPHAN HYDROBROMIDE SCH TAB: 600; 30 TABLET, EXTENDED RELEASE ORAL at 17:14

## 2018-10-06 RX ADMIN — IPRATROPIUM BROMIDE AND ALBUTEROL SULFATE SCH: .5; 3 SOLUTION RESPIRATORY (INHALATION) at 02:13

## 2018-10-06 RX ADMIN — INSULIN LISPRO SCH UNITS: 100 INJECTION, SOLUTION INTRAVENOUS; SUBCUTANEOUS at 17:15

## 2018-10-06 RX ADMIN — GUAIFENESIN AND DEXTROMETHORPHAN HYDROBROMIDE SCH TAB: 600; 30 TABLET, EXTENDED RELEASE ORAL at 09:35

## 2018-10-06 RX ADMIN — POTASSIUM CHLORIDE SCH MEQ: 10 TABLET, FILM COATED, EXTENDED RELEASE ORAL at 08:17

## 2018-10-06 RX ADMIN — GUAIFENESIN AND DEXTROMETHORPHAN HYDROBROMIDE SCH TAB: 600; 30 TABLET, EXTENDED RELEASE ORAL at 17:13

## 2018-10-06 RX ADMIN — INSULIN LISPRO SCH: 100 INJECTION, SOLUTION INTRAVENOUS; SUBCUTANEOUS at 06:43

## 2018-10-06 RX ADMIN — IPRATROPIUM BROMIDE AND ALBUTEROL SULFATE SCH ML: .5; 3 SOLUTION RESPIRATORY (INHALATION) at 20:44

## 2018-10-06 RX ADMIN — IPRATROPIUM BROMIDE AND ALBUTEROL SULFATE SCH ML: .5; 3 SOLUTION RESPIRATORY (INHALATION) at 13:13

## 2018-10-06 RX ADMIN — POTASSIUM CHLORIDE SCH MEQ: 10 TABLET, FILM COATED, EXTENDED RELEASE ORAL at 17:13

## 2018-10-06 RX ADMIN — PANTOPRAZOLE SODIUM SCH MG: 40 TABLET, DELAYED RELEASE ORAL at 05:40

## 2018-10-06 RX ADMIN — INSULIN LISPRO SCH UNITS: 100 INJECTION, SOLUTION INTRAVENOUS; SUBCUTANEOUS at 21:21

## 2018-10-06 RX ADMIN — GUAIFENESIN AND DEXTROMETHORPHAN HYDROBROMIDE SCH TAB: 600; 30 TABLET, EXTENDED RELEASE ORAL at 14:00

## 2018-10-06 RX ADMIN — IPRATROPIUM BROMIDE AND ALBUTEROL SULFATE SCH ML: .5; 3 SOLUTION RESPIRATORY (INHALATION) at 07:21

## 2018-10-06 RX ADMIN — INSULIN LISPRO SCH UNITS: 100 INJECTION, SOLUTION INTRAVENOUS; SUBCUTANEOUS at 12:24

## 2018-10-06 NOTE — PN
DATE:  10/06/2018



SUBJECTIVE:  The patient is 78 years old, seen and examined, still has

cough, congestion and wheezing, gets short of breath on walking, receiving

physical therapy with some improvement.



PHYSICAL EXAMINATION

VITAL SIGNS:  He is afebrile, pulse 83, respirations 18, blood pressure

154/86.

LUNGS:  Bilateral fair airflow.  Few soft crackles, scattered through all

lung fields, more so in the upper lung field.

HEART:  S1 and S2 audible.

ABDOMEN:  Soft, nontender.  No rebound.  No guarding.

NEUROLOGICAL:  He is awake, alert, oriented, communicative.



LABORATORY DATA:  Blood sugar is 127.



ASSESSMENT:

1.  Chronic obstructive pulmonary disease exacerbation.

2.  Asthmatic bronchitis.

3.  Non-insulin-dependent diabetes.

4.  Morbid obesity.

5.  Obstructive sleep apnea.



PLAN:  Currently, the patient is on glimepiride.  We will continue on

clonidine.  He is on Coreg twice a day, I will continue that along with

losartan and he is getting Tensilon and Lasix 40 daily.  I will continue on

Levaquin.  Continue physical therapy.  We will follow up the patient in the

a.m.





__________________________________________

Audie Pathak MD



DD:  10/06/2018 11:39:22

DT:  10/06/2018 14:23:30

Job # [id number]

## 2018-10-07 RX ADMIN — IPRATROPIUM BROMIDE AND ALBUTEROL SULFATE SCH ML: .5; 3 SOLUTION RESPIRATORY (INHALATION) at 07:15

## 2018-10-07 RX ADMIN — IPRATROPIUM BROMIDE AND ALBUTEROL SULFATE SCH ML: .5; 3 SOLUTION RESPIRATORY (INHALATION) at 20:24

## 2018-10-07 RX ADMIN — INSULIN LISPRO SCH UNITS: 100 INJECTION, SOLUTION INTRAVENOUS; SUBCUTANEOUS at 17:28

## 2018-10-07 RX ADMIN — IPRATROPIUM BROMIDE AND ALBUTEROL SULFATE SCH: .5; 3 SOLUTION RESPIRATORY (INHALATION) at 01:50

## 2018-10-07 RX ADMIN — IPRATROPIUM BROMIDE AND ALBUTEROL SULFATE SCH ML: .5; 3 SOLUTION RESPIRATORY (INHALATION) at 14:07

## 2018-10-07 RX ADMIN — INSULIN LISPRO SCH UNITS: 100 INJECTION, SOLUTION INTRAVENOUS; SUBCUTANEOUS at 21:47

## 2018-10-07 RX ADMIN — INSULIN LISPRO SCH UNITS: 100 INJECTION, SOLUTION INTRAVENOUS; SUBCUTANEOUS at 06:31

## 2018-10-07 RX ADMIN — GUAIFENESIN AND DEXTROMETHORPHAN HYDROBROMIDE SCH TAB: 600; 30 TABLET, EXTENDED RELEASE ORAL at 17:30

## 2018-10-07 RX ADMIN — POTASSIUM CHLORIDE SCH MEQ: 10 TABLET, FILM COATED, EXTENDED RELEASE ORAL at 17:30

## 2018-10-07 RX ADMIN — GUAIFENESIN AND DEXTROMETHORPHAN HYDROBROMIDE SCH TAB: 600; 30 TABLET, EXTENDED RELEASE ORAL at 10:09

## 2018-10-07 RX ADMIN — INSULIN LISPRO SCH UNITS: 100 INJECTION, SOLUTION INTRAVENOUS; SUBCUTANEOUS at 12:01

## 2018-10-07 RX ADMIN — POTASSIUM CHLORIDE SCH MEQ: 10 TABLET, FILM COATED, EXTENDED RELEASE ORAL at 07:56

## 2018-10-07 RX ADMIN — PANTOPRAZOLE SODIUM SCH MG: 40 TABLET, DELAYED RELEASE ORAL at 06:17

## 2018-10-07 RX ADMIN — GUAIFENESIN AND DEXTROMETHORPHAN HYDROBROMIDE SCH TAB: 600; 30 TABLET, EXTENDED RELEASE ORAL at 13:28

## 2018-10-08 VITALS — RESPIRATION RATE: 20 BRPM

## 2018-10-08 RX ADMIN — PANTOPRAZOLE SODIUM SCH MG: 40 TABLET, DELAYED RELEASE ORAL at 06:09

## 2018-10-08 RX ADMIN — INSULIN LISPRO SCH UNITS: 100 INJECTION, SOLUTION INTRAVENOUS; SUBCUTANEOUS at 17:06

## 2018-10-08 RX ADMIN — INSULIN LISPRO SCH UNITS: 100 INJECTION, SOLUTION INTRAVENOUS; SUBCUTANEOUS at 06:53

## 2018-10-08 RX ADMIN — INSULIN LISPRO SCH UNITS: 100 INJECTION, SOLUTION INTRAVENOUS; SUBCUTANEOUS at 12:22

## 2018-10-08 RX ADMIN — IPRATROPIUM BROMIDE AND ALBUTEROL SULFATE SCH ML: .5; 3 SOLUTION RESPIRATORY (INHALATION) at 19:48

## 2018-10-08 RX ADMIN — IPRATROPIUM BROMIDE AND ALBUTEROL SULFATE SCH ML: .5; 3 SOLUTION RESPIRATORY (INHALATION) at 01:43

## 2018-10-08 RX ADMIN — GUAIFENESIN AND DEXTROMETHORPHAN HYDROBROMIDE SCH TAB: 600; 30 TABLET, EXTENDED RELEASE ORAL at 14:45

## 2018-10-08 RX ADMIN — GUAIFENESIN AND DEXTROMETHORPHAN HYDROBROMIDE SCH TAB: 600; 30 TABLET, EXTENDED RELEASE ORAL at 10:22

## 2018-10-08 RX ADMIN — IPRATROPIUM BROMIDE AND ALBUTEROL SULFATE SCH ML: .5; 3 SOLUTION RESPIRATORY (INHALATION) at 07:16

## 2018-10-08 RX ADMIN — POTASSIUM CHLORIDE SCH MEQ: 10 TABLET, FILM COATED, EXTENDED RELEASE ORAL at 17:07

## 2018-10-08 RX ADMIN — IPRATROPIUM BROMIDE AND ALBUTEROL SULFATE SCH ML: .5; 3 SOLUTION RESPIRATORY (INHALATION) at 13:31

## 2018-10-08 RX ADMIN — GUAIFENESIN AND DEXTROMETHORPHAN HYDROBROMIDE SCH TAB: 600; 30 TABLET, EXTENDED RELEASE ORAL at 17:07

## 2018-10-08 RX ADMIN — POTASSIUM CHLORIDE SCH MEQ: 10 TABLET, FILM COATED, EXTENDED RELEASE ORAL at 07:52

## 2018-10-08 NOTE — PN
DATE:  10/08/2018



SUBJECTIVE:  Patient is 78 years old, seen and examined, seen in gym, doing

treadmill exercises, gets short of breath when he walks small distance, but

does not desaturate.



PHYSICAL EXAMINATION:

VITAL SIGNS:  Patient is afebrile, pulse 75, respiration 20, blood pressure

140/70.

LUNGS:  Bilateral soft expiratory rhonchi.

HEART:  S1 and S2 audible.

ABDOMEN:  Soft, obese, nontender.  No rebound.  No guarding.

NEUROLOGICAL:  The patient is awake, alert, oriented, communicative.



LABORATORY EXAM:  Blood sugar is 182.



ASSESSMENT:

1.  Chronic obstructive pulmonary disease exacerbation.

2.  Congestive heart failure, acute-on-chronic systolic, seems to be

resolving.

3.  Morbid obesity.

4.  History of smoking.

5.  Hypertension.

6.  Hyperlipidemia.



PLAN:  Continue patient on his usual medication, and write hydralazine 25

twice a day, continue on carvedilol.  He is on losartan, Norvasc and

continue nebulizer treatment.  Monitor blood sugar, continue on Lasix.  I

will add chest PT and we will start to taper prednisone from tomorrow. 

Monitor blood sugar.  Follow up patient in a.m.





__________________________________________

Audie Pathak MD



DD:  10/08/2018 12:22:49

DT:  10/08/2018 13:56:32

Job # [id number]

## 2018-10-09 RX ADMIN — IPRATROPIUM BROMIDE AND ALBUTEROL SULFATE SCH ML: .5; 3 SOLUTION RESPIRATORY (INHALATION) at 21:07

## 2018-10-09 RX ADMIN — IPRATROPIUM BROMIDE AND ALBUTEROL SULFATE SCH ML: .5; 3 SOLUTION RESPIRATORY (INHALATION) at 02:20

## 2018-10-09 RX ADMIN — GUAIFENESIN AND DEXTROMETHORPHAN HYDROBROMIDE SCH TAB: 600; 30 TABLET, EXTENDED RELEASE ORAL at 10:32

## 2018-10-09 RX ADMIN — GUAIFENESIN AND DEXTROMETHORPHAN HYDROBROMIDE SCH TAB: 600; 30 TABLET, EXTENDED RELEASE ORAL at 18:31

## 2018-10-09 RX ADMIN — IPRATROPIUM BROMIDE AND ALBUTEROL SULFATE SCH ML: .5; 3 SOLUTION RESPIRATORY (INHALATION) at 13:14

## 2018-10-09 RX ADMIN — PANTOPRAZOLE SODIUM SCH MG: 40 TABLET, DELAYED RELEASE ORAL at 05:49

## 2018-10-09 RX ADMIN — POTASSIUM CHLORIDE SCH MEQ: 10 TABLET, FILM COATED, EXTENDED RELEASE ORAL at 07:52

## 2018-10-09 RX ADMIN — IPRATROPIUM BROMIDE AND ALBUTEROL SULFATE SCH ML: .5; 3 SOLUTION RESPIRATORY (INHALATION) at 07:00

## 2018-10-09 RX ADMIN — INSULIN LISPRO SCH: 100 INJECTION, SOLUTION INTRAVENOUS; SUBCUTANEOUS at 01:08

## 2018-10-09 RX ADMIN — GUAIFENESIN AND DEXTROMETHORPHAN HYDROBROMIDE SCH TAB: 600; 30 TABLET, EXTENDED RELEASE ORAL at 14:09

## 2018-10-09 RX ADMIN — POTASSIUM CHLORIDE SCH MEQ: 10 TABLET, FILM COATED, EXTENDED RELEASE ORAL at 18:31

## 2018-10-09 RX ADMIN — INSULIN LISPRO SCH: 100 INJECTION, SOLUTION INTRAVENOUS; SUBCUTANEOUS at 06:39

## 2018-10-09 RX ADMIN — INSULIN LISPRO SCH UNITS: 100 INJECTION, SOLUTION INTRAVENOUS; SUBCUTANEOUS at 11:19

## 2018-10-09 RX ADMIN — INSULIN LISPRO SCH UNITS: 100 INJECTION, SOLUTION INTRAVENOUS; SUBCUTANEOUS at 18:01

## 2018-10-09 RX ADMIN — INSULIN LISPRO SCH: 100 INJECTION, SOLUTION INTRAVENOUS; SUBCUTANEOUS at 21:36

## 2018-10-09 NOTE — PN
DATE:  10/09/2018



SUBJECTIVE:  The patient is a 78-year-old, seen and examined, sitting in

chair, seems to be comfortable.  Still has cough and congestion, but better

than before.



PHYSICAL EXAMINATION

VITAL SIGNS:  He is afebrile, pulse 77, respiration 20, blood pressure

134/68.

LUNGS:  Bilateral diffusely decreased breath sound.  Soft crackle in the

upper lung region posteriorly, much better than before.

HEART:  S1, S2 audible.

ABDOMEN:  Soft, obese, nontender.  No rebound, no guarding.

NEUROLOGIC:  He is awake, alert, oriented, communicative.



LABORATORY DATA:  Blood sugar is 179.



ASSESSMENT:

1.  Chronic obstructive pulmonary disease exacerbation.

2.  Multilobar pneumonia.

3.  Hyperlipidemia.

4.  Morbid obesity.

5.  Mild congestive heart failure, acute on chronic, systolic.

6.  Non-insulin-dependent diabetes.



PLAN:  We will continue the patient on glimepiride.  He is on hydralazine. 

We will continue carvedilol, losartan.  He is getting nebulizer treatment. 

He is on 500 mg of Levaquin.  We will continue that and possible discharge

in a.m.





__________________________________________

Audie Pathak MD



DD:  10/09/2018 19:26:50

DT:  10/09/2018 21:47:41

Job # [id number]

## 2018-10-10 VITALS
TEMPERATURE: 97.4 F | DIASTOLIC BLOOD PRESSURE: 83 MMHG | SYSTOLIC BLOOD PRESSURE: 160 MMHG | OXYGEN SATURATION: 98 % | HEART RATE: 85 BPM

## 2018-10-10 RX ADMIN — GUAIFENESIN AND DEXTROMETHORPHAN HYDROBROMIDE SCH: 600; 30 TABLET, EXTENDED RELEASE ORAL at 09:53

## 2018-10-10 RX ADMIN — INSULIN LISPRO SCH UNITS: 100 INJECTION, SOLUTION INTRAVENOUS; SUBCUTANEOUS at 12:05

## 2018-10-10 RX ADMIN — IPRATROPIUM BROMIDE AND ALBUTEROL SULFATE SCH ML: .5; 3 SOLUTION RESPIRATORY (INHALATION) at 13:24

## 2018-10-10 RX ADMIN — INSULIN LISPRO SCH UNITS: 100 INJECTION, SOLUTION INTRAVENOUS; SUBCUTANEOUS at 17:00

## 2018-10-10 RX ADMIN — GUAIFENESIN AND DEXTROMETHORPHAN HYDROBROMIDE SCH TAB: 600; 30 TABLET, EXTENDED RELEASE ORAL at 17:47

## 2018-10-10 RX ADMIN — INSULIN LISPRO SCH: 100 INJECTION, SOLUTION INTRAVENOUS; SUBCUTANEOUS at 06:29

## 2018-10-10 RX ADMIN — IPRATROPIUM BROMIDE AND ALBUTEROL SULFATE SCH ML: .5; 3 SOLUTION RESPIRATORY (INHALATION) at 07:03

## 2018-10-10 RX ADMIN — GUAIFENESIN AND DEXTROMETHORPHAN HYDROBROMIDE SCH: 600; 30 TABLET, EXTENDED RELEASE ORAL at 13:42

## 2018-10-10 RX ADMIN — POTASSIUM CHLORIDE SCH MEQ: 10 TABLET, FILM COATED, EXTENDED RELEASE ORAL at 08:08

## 2018-10-10 RX ADMIN — GUAIFENESIN AND DEXTROMETHORPHAN HYDROBROMIDE SCH TAB: 600; 30 TABLET, EXTENDED RELEASE ORAL at 09:49

## 2018-10-10 RX ADMIN — POTASSIUM CHLORIDE SCH MEQ: 10 TABLET, FILM COATED, EXTENDED RELEASE ORAL at 17:47

## 2018-10-10 RX ADMIN — IPRATROPIUM BROMIDE AND ALBUTEROL SULFATE SCH ML: .5; 3 SOLUTION RESPIRATORY (INHALATION) at 02:10

## 2018-10-10 RX ADMIN — PANTOPRAZOLE SODIUM SCH MG: 40 TABLET, DELAYED RELEASE ORAL at 06:23

## 2018-10-11 NOTE — DS
HISTORY OF PRESENT ILLNESS:  The patient is 78 years old who was initially

admitted with cough, congestion, shortness of breath, was having difficulty

breathing, was on CPAP, started on diuretics, IV steroid.  Blood sugar has

been fluctuating because of being on steroid and did well.  Had CT scan

done that shows bilobar pneumonia, has been on antibiotics, seems to be

getting better, ambulatory.



PHYSICAL EXAMINATION:

GENERAL:  Today, he is awake, alert, oriented, and communicative.

VITAL SIGNS:  He is afebrile, pulse 75, respirations 16, and blood pressure

154/70.

LUNGS:  Bilateral good airflow.  No rhonchi or crackle.

HEART:  S1, S2 audible.

ABDOMEN:  Soft, obese, and nontender.  No rebound.  No guarding.

NEUROLOGICAL:  Patient is awake, alert, oriented, and communicative.



LABORATORY EXAM:  Blood sugar is 300.



ASSESSMENT:

1.  Multilobar pneumonia.

2.  Hypertension.

3.  Congestive heart failure, mild, acute on chronic systolic.

4.  Non-insulin-dependent diabetes.

5.  Morbid obesity.



PLAN:  Patient is being discharged home on his usual medication.  He will

resume his medications including glimepiride, carvedilol, losartan.  We

will arrange for nebulizer machine.  He was given a prescription of

prednisone 10 mg daily for 5 more days.  He does not need any more

antibiotics.  He will go back on his insulin regimen.  He takes nisoldipine

and carvedilol at home.  He will follow with Dr. Solis as outpatient.







__________________________________________

Audie Pathak MD



DD:  10/10/2018 14:56:59

DT:  10/11/2018 7:42:23

Job # [id number]